# Patient Record
Sex: FEMALE | Race: BLACK OR AFRICAN AMERICAN | NOT HISPANIC OR LATINO
[De-identification: names, ages, dates, MRNs, and addresses within clinical notes are randomized per-mention and may not be internally consistent; named-entity substitution may affect disease eponyms.]

---

## 2017-10-10 PROBLEM — Z00.00 ENCOUNTER FOR PREVENTIVE HEALTH EXAMINATION: Status: ACTIVE | Noted: 2017-10-10

## 2017-10-13 ENCOUNTER — MEDICATION RENEWAL (OUTPATIENT)
Age: 47
End: 2017-10-13

## 2017-11-28 ENCOUNTER — MEDICATION RENEWAL (OUTPATIENT)
Age: 47
End: 2017-11-28

## 2018-01-26 ENCOUNTER — APPOINTMENT (OUTPATIENT)
Dept: ENDOCRINOLOGY | Facility: CLINIC | Age: 48
End: 2018-01-26

## 2018-05-08 ENCOUNTER — APPOINTMENT (OUTPATIENT)
Dept: ENDOCRINOLOGY | Facility: CLINIC | Age: 48
End: 2018-05-08
Payer: COMMERCIAL

## 2018-05-08 VITALS
HEIGHT: 64 IN | TEMPERATURE: 97.3 F | HEART RATE: 82 BPM | DIASTOLIC BLOOD PRESSURE: 77 MMHG | WEIGHT: 214 LBS | OXYGEN SATURATION: 99 % | SYSTOLIC BLOOD PRESSURE: 110 MMHG | BODY MASS INDEX: 36.54 KG/M2

## 2018-05-08 DIAGNOSIS — D64.9 ANEMIA, UNSPECIFIED: ICD-10-CM

## 2018-05-08 DIAGNOSIS — Z83.3 FAMILY HISTORY OF DIABETES MELLITUS: ICD-10-CM

## 2018-05-08 DIAGNOSIS — Z82.49 FAMILY HISTORY OF ISCHEMIC HEART DISEASE AND OTHER DISEASES OF THE CIRCULATORY SYSTEM: ICD-10-CM

## 2018-05-08 DIAGNOSIS — Z82.5 FAMILY HISTORY OF ASTHMA AND OTHER CHRONIC LOWER RESPIRATORY DISEASES: ICD-10-CM

## 2018-05-08 PROCEDURE — 99214 OFFICE O/P EST MOD 30 MIN: CPT

## 2018-05-22 ENCOUNTER — CLINICAL ADVICE (OUTPATIENT)
Age: 48
End: 2018-05-22

## 2018-05-22 LAB
25(OH)D3 SERPL-MCNC: 23.9 NG/ML
ALBUMIN SERPL ELPH-MCNC: 3.9 G/DL
ALP BLD-CCNC: 40 U/L
ALT SERPL-CCNC: 18 U/L
ANION GAP SERPL CALC-SCNC: 13 MMOL/L
AST SERPL-CCNC: 21 U/L
BASOPHILS # BLD AUTO: 0.01 K/UL
BASOPHILS NFR BLD AUTO: 0.3 %
BILIRUB SERPL-MCNC: 0.4 MG/DL
BUN SERPL-MCNC: 12 MG/DL
CALCIUM SERPL-MCNC: 9.4 MG/DL
CHLORIDE SERPL-SCNC: 102 MMOL/L
CHOLEST SERPL-MCNC: 171 MG/DL
CHOLEST/HDLC SERPL: 2.2 RATIO
CO2 SERPL-SCNC: 23 MMOL/L
CREAT SERPL-MCNC: 0.86 MG/DL
EOSINOPHIL # BLD AUTO: 0.03 K/UL
EOSINOPHIL NFR BLD AUTO: 0.8 %
GLUCOSE SERPL-MCNC: 84 MG/DL
HBA1C MFR BLD HPLC: 5.3 %
HCT VFR BLD CALC: 40.8 %
HDLC SERPL-MCNC: 78 MG/DL
HGB BLD-MCNC: 12.2 G/DL
IMM GRANULOCYTES NFR BLD AUTO: 0 %
INSULIN SERPL-MCNC: 5.5 UU/ML
IRON SATN MFR SERPL: 23 %
IRON SERPL-MCNC: 72 UG/DL
LDLC SERPL CALC-MCNC: 85 MG/DL
LYMPHOCYTES # BLD AUTO: 1.8 K/UL
LYMPHOCYTES NFR BLD AUTO: 45.7 %
MAN DIFF?: NORMAL
MCHC RBC-ENTMCNC: 26.9 PG
MCHC RBC-ENTMCNC: 29.9 GM/DL
MCV RBC AUTO: 90.1 FL
MONOCYTES # BLD AUTO: 0.42 K/UL
MONOCYTES NFR BLD AUTO: 10.7 %
NEUTROPHILS # BLD AUTO: 1.68 K/UL
NEUTROPHILS NFR BLD AUTO: 42.5 %
PLATELET # BLD AUTO: 175 K/UL
POTASSIUM SERPL-SCNC: 4.3 MMOL/L
PROLACTIN SERPL-MCNC: 41.9 NG/ML
PROT SERPL-MCNC: 7.1 G/DL
RBC # BLD: 4.53 M/UL
RBC # FLD: 14.4 %
SODIUM SERPL-SCNC: 138 MMOL/L
T4 FREE SERPL-MCNC: 1.3 NG/DL
TESTOST BND SERPL-MCNC: 1 PG/ML
TESTOST SERPL-MCNC: 48.7 NG/DL
TIBC SERPL-MCNC: 316 UG/DL
TRIGL SERPL-MCNC: 41 MG/DL
TSH SERPL-ACNC: 1.36 UIU/ML
UIBC SERPL-MCNC: 244 UG/DL
WBC # FLD AUTO: 3.94 K/UL

## 2018-05-22 RX ORDER — UBIDECARENONE/VIT E ACET 100MG-5
50 MCG CAPSULE ORAL
Refills: 0 | Status: ACTIVE | COMMUNITY
Start: 2018-05-22

## 2018-09-28 ENCOUNTER — APPOINTMENT (OUTPATIENT)
Dept: ENDOCRINOLOGY | Facility: CLINIC | Age: 48
End: 2018-09-28

## 2018-11-02 ENCOUNTER — CLINICAL ADVICE (OUTPATIENT)
Age: 48
End: 2018-11-02

## 2019-01-07 ENCOUNTER — APPOINTMENT (OUTPATIENT)
Dept: ENDOCRINOLOGY | Facility: CLINIC | Age: 49
End: 2019-01-07
Payer: COMMERCIAL

## 2019-01-07 VITALS
HEART RATE: 67 BPM | BODY MASS INDEX: 35.34 KG/M2 | HEIGHT: 64 IN | WEIGHT: 207 LBS | DIASTOLIC BLOOD PRESSURE: 66 MMHG | OXYGEN SATURATION: 97 % | TEMPERATURE: 97.4 F | SYSTOLIC BLOOD PRESSURE: 101 MMHG

## 2019-01-07 PROCEDURE — 99214 OFFICE O/P EST MOD 30 MIN: CPT

## 2019-01-10 NOTE — HISTORY OF PRESENT ILLNESS
[FreeTextEntry1] : She now returns after a hiatus of several months.\dao Feels generally well, and has no new physical complaints.\dao Had lost weight down to 199 lb on her scale, but gained back 5-10 lb during a trip to [place] and from holiday/birthday eating.\dao Exercise is limited to 2 days of kick boxing and martial arts.  Has not been running outdoors.\dao Menses have been slightly irregular--had menses 2 weeks apart in November, but otherwise has continued monthly.  Is probably not having any vasomotor symptoms.\dao Hirsutism is "just as bad"\dao Had the diagnostic mammogram to evaluate a possible abnormality on the left side, and this was negative.\dao Has continued going to the weekly meetings at the [facility] loss program.  Diet lapses over the holidays included baked goods, alcohol and excessive portions of "allowed" foods.\dao Admits to missing occasional dioses of cabergoline, and has been taking the spironolactone only 2-3 days/week.

## 2019-08-13 ENCOUNTER — APPOINTMENT (OUTPATIENT)
Dept: ENDOCRINOLOGY | Facility: CLINIC | Age: 49
End: 2019-08-13
Payer: COMMERCIAL

## 2019-08-13 VITALS
OXYGEN SATURATION: 98 % | WEIGHT: 208 LBS | SYSTOLIC BLOOD PRESSURE: 96 MMHG | DIASTOLIC BLOOD PRESSURE: 63 MMHG | HEIGHT: 64 IN | BODY MASS INDEX: 35.51 KG/M2 | TEMPERATURE: 98.2 F | HEART RATE: 72 BPM

## 2019-08-13 DIAGNOSIS — F32.9 MAJOR DEPRESSIVE DISORDER, SINGLE EPISODE, UNSPECIFIED: ICD-10-CM

## 2019-08-13 PROCEDURE — 99214 OFFICE O/P EST MOD 30 MIN: CPT

## 2019-08-15 PROBLEM — F32.9 DEPRESSION: Status: ACTIVE | Noted: 2018-05-08

## 2019-08-15 NOTE — HISTORY OF PRESENT ILLNESS
[FreeTextEntry1] : Has not had any significant medical issues since her last visit in January, and has no new physical complaints.\par Weight has been stable overall.  (Had gained 7 lb after she took a hiatus from the weight loss program at [facility], but has since lost it back).\par Menses have been generally regular since January with a cycle length of 31 days, though she had one interval of 49 days with amenorrhea.  Saw a new gynecologist in July, had an ultrasound which was apparently negative for PCOS--was told that there "might have been a cyst that ruptured."\par Hirsutism is unchanged, but she admits that she has not been taking the spironolactone regularly.\dao Has also not been taking vitamin D.\par Visual field exam done by her ophthalmologist last month was apparently normal.  \dao Has been having somewhat more frequent headaches, but thinks that these are due to stopping coffee.  \par Has no side-effects from the cabergoline.\dao Has not been exercising regularly--says "I keep starting and stopping."\par Current diet:\par --Breakfast is an egg and a banana. \par --Snacks on pistachio nuts between meals\par --Brings lunch to work--usually leftovers from the night before.  If she goes out, it is a salad or soup\par --Supper is usually protein (chicken or fish) and vegetables.\par --Has reduced her intake of wine to one glass with supper, and does not have it every day\par --Cake, cookies, etc at night are still intermittently a problem. Will sometimes try to limit to one piece of dark chocolate.

## 2019-08-15 NOTE — REVIEW OF SYSTEMS
[Fatigue] : fatigue [Decreased Appetite] : appetite not decreased [Recent Weight Gain (___ Lbs)] : no recent weight gain [Recent Weight Loss (___ Lbs)] : no recent weight loss [Fever] : no fever [Chills] : no chills [Blurry Vision] : no blurred vision [Dry Eyes] : no dryness of the eyes [Eyes Itch] : no itching of the eyes [Dysphagia] : no dysphagia [Hearing Loss] : no hearing loss  [Chest Pain] : no chest pain [Palpitations] : no palpitations [Lower Ext Edema] : no lower extremity edema [Shortness Of Breath] : no shortness of breath [Wheezing] : no wheezing was heard [Cough] : no cough [SOB on Exertion] : no shortness of breath during exertion [Nausea] : no nausea [Constipation] : no constipation [Diarrhea] : no diarrhea [Heartburn] : no heartburn [Polyuria] : no polyuria [Dysuria] : no dysuria [Nocturia] : no nocturia [Muscle Cramps] : no muscle cramps [Myalgia] : no myalgia  [Dry Skin] : no dry skin [Tremors] : no tremors [Pain/Numbness of Digits] : no pain/numbness of digits [Difficulty Walking] : no difficulty walking [Depression] : no depression [Anxiety] : no anxiety [Stress] : no stress [Polydipsia] : no polydipsia [Cold Intolerance] : cold tolerant [Heat Intolerance] : heat tolerant [Easy Bleeding] : no ~M tendency for easy bleeding [Easy Bruising] : no tendency for easy bruising [FreeTextEntry3] : Has early cataracts, also apparently mildly glaucomatous changes on her last exam [FreeTextEntry9] : Mild discomfort (without swelling) in her left knee [de-identified] : Moderate facial hirsutism and mild acne.  Also with hair loss frontally [de-identified] : No insomnia, but sleeps only 6 hours a night and does not get catch-up sleep on weekends

## 2019-08-15 NOTE — PHYSICAL EXAM
[Alert] : alert [Healthy Appearance] : healthy appearance [Normal Sclera/Conjunctiva] : normal sclera/conjunctiva [PERRL] : pupils equal, round and reactive to light [EOMI] : extra ocular movement intact [No Proptosis] : no proptosis [No Lid Lag] : no lid lag [Normal Outer Ear/Nose] : the ears and nose were normal in appearance [Normal Hearing] : hearing was normal [No Neck Mass] : no neck mass was observed [No LAD] : no lymphadenopathy [Thyroid Not Enlarged] : the thyroid was not enlarged [Clear to Auscultation] : lungs were clear to auscultation bilaterally [Normal Rate] : heart rate was normal  [Regular Rhythm] : with a regular rhythm [Carotids Normal] : carotid pulses were normal with no bruits [No Edema] : there was no peripheral edema [Not Tender] : non-tender [No HSM] : no hepato-splenomegaly [Soft] : abdomen soft [Normal] : normal and non tender [No CVA Tenderness] : no ~M costovertebral angle tenderness [Normal Gait] : normal gait [No Joint Swelling] : no joint swelling seen [Normal Strength/Tone] : muscle strength and tone were normal [No Involuntary Movements] : no involuntary movements were seen [No Rash] : no rash [Hirsutism] : hirsutism [No Tremors] : no tremors [Normal Sensation on Monofilament Testing] : normal sensation on monofilament testing of lower extremities [Normal Affect] : the affect was normal [Normal Mood] : the mood was normal [Kyphosis] : no kyphosis present [Foot Ulcers] : no foot ulcers [de-identified] : Moderately obese [de-identified] : No murmurs [de-identified] : Visual fields normal to confrontational testing [de-identified] : DP pulses non-palpable bilaterally but capillary refill normal [de-identified] : No cervical or supraclavicular adenopathy [de-identified] : Mild frontal hairline recession.  NO acanthosis [de-identified] : Vibratory sensation mildly decreased over the toes

## 2019-09-06 ENCOUNTER — APPOINTMENT (OUTPATIENT)
Dept: ENDOCRINOLOGY | Facility: CLINIC | Age: 49
End: 2019-09-06

## 2019-12-03 ENCOUNTER — MEDICATION RENEWAL (OUTPATIENT)
Age: 49
End: 2019-12-03

## 2020-06-12 ENCOUNTER — APPOINTMENT (OUTPATIENT)
Dept: ENDOCRINOLOGY | Facility: CLINIC | Age: 50
End: 2020-06-12
Payer: COMMERCIAL

## 2020-06-12 VITALS
HEART RATE: 70 BPM | WEIGHT: 228 LBS | OXYGEN SATURATION: 100 % | HEIGHT: 64 IN | SYSTOLIC BLOOD PRESSURE: 104 MMHG | BODY MASS INDEX: 38.93 KG/M2 | TEMPERATURE: 97.6 F | DIASTOLIC BLOOD PRESSURE: 70 MMHG

## 2020-06-12 PROCEDURE — 99214 OFFICE O/P EST MOD 30 MIN: CPT

## 2020-06-13 NOTE — PHYSICAL EXAM
[Alert] : alert [Normal Sclera/Conjunctiva] : normal sclera/conjunctiva [No Acute Distress] : no acute distress [EOMI] : extra ocular movement intact [Visual Fields Grossly Intact] : visual fields grossly intact [PERRL] : pupils equal, round and reactive to light [No Proptosis] : no proptosis [No Lid Lag] : no lid lag [Normal Outer Ear/Nose] : the ears and nose were normal in appearance [Normal Hearing] : hearing was normal [No LAD] : no lymphadenopathy [No Neck Mass] : no neck mass was observed [Thyroid Not Enlarged] : the thyroid was not enlarged [No Thyroid Nodules] : no palpable thyroid nodules [Clear to Auscultation] : lungs were clear to auscultation bilaterally [Regular Rhythm] : with a regular rhythm [Normal Rate] : heart rate was normal [Carotids Normal] : carotid pulses were normal with no bruits [No Edema] : no peripheral edema [Not Tender] : non-tender [No HSM] : no hepato-splenomegaly [Soft] : abdomen soft [No CVA Tenderness] : no ~M costovertebral angle tenderness [Normal Anterior Cervical Nodes] : no anterior cervical lymphadenopathy [Normal Supraclavicular Nodes] : no supraclavicular lymphadenopathy [Normal Gait] : normal gait [No Involuntary Movements] : no involuntary movements were seen [No Joint Swelling] : no joint swelling seen [No Tremors] : no tremors [Normal Affect] : the affect was normal [Normal Sensation on Monofilament Testing] : normal sensation on monofilament testing of lower extremities [Normal Mood] : the mood was normal [Kyphosis] : no kyphosis present [Abdominal Striae] : no abdominal striae [Foot Ulcers] : no foot ulcers [de-identified] : Significantly obese [de-identified] : DP pulses 1+ on the left, non-palpable (but with brisk capillary refill) on the right [de-identified] : No murmurs [de-identified] : No noticeable acanthosis in the posterior cervical area.  Moderate facial hirsutism [de-identified] : Vibratory sensation mildly decreased over the toes.  Slight decrease in  strength R hand

## 2020-06-13 NOTE — ASSESSMENT
[FreeTextEntry1] : 1) Hyperprolactinemia/pituitary adenoma:  Prolactin level is normal on her current cabergoline regimen, which she is tolerating without side-effects.  Her last pituitary MRI was in October of 2018 and showed the microadenoma to be stable in size at 5 mm.  The failure of the microadenoma to regress further may be due to the occasional lapses in cabergoline therapy, though it is also possible that the adenoma is in fact non-secretory and not a true "prolactinoma." \par --Continue cabergoline at 0.75 mg 2X/week\par --Will obtain an updated pituitary MRI later this year \par \par 2) PCOS:  Her testosterone level remains in the normal range, but she is "clinically worse" in terms of her oligomenorrhea and worsening hirsutism.  The menstrual irregularity might reflect genia-menopause rather than PCOS, but she has no associated vasomotor symptoms.\par --Reversal of her weight gain is almost certainly the next step.  (See below).\par --Will restart metformin at 500 mg BID\par \par 3) Hirsutism:  Has possibly worsened due to the effects of her weight gain on the PCOS.\par --She will probably try to restart the spironolactone, taking it at night after supper\par \par 4) Obesity:  Has gained back 20 lb since her visit last year.  Although she has not had any intervals with significant dietary lapses, her caloric intake of 1800 calories is higher than her intake during the interval when she was losing weight, and is not likely a sufficient restriction at this point--especially given her somewhat limited physical activity--(both in terms of structured exercise and the fact that she is no longer commuting to and from work every day).  \par --Suggested that she try to decrease her caloric intake back to 1600 calories a day.  The "low hanging fruit" to eliminate from her diet would be the sugar in her coffee, the wine at supper, her tendency to snack on nuts during the day, and the protein shake after supper.\par --Suggested that she look at the food diaries which she kept during her period of maximal weight loss, and compare these to her current eating patterns\par \par 5) Carpal tunnel syndrome L wrist--Symptoms are clearly worse\par --Needs to resume wearing a wrist splint at night.\par --Re-evaluation by hand surgeon if symptoms do not improve with the splint\par \par 25-D level is barely into the target range, and she has lapsed on her supplementation.  To resume 1000 units/day\par \par See for follow-up in 4 months.  CMP, lipids, A1c, Total/bioavail testosterone, PRL, 25-D, insulin level before the visit [FreeTextEntry2] : Diet, metformin and spironolactone therapy

## 2020-06-13 NOTE — DATA REVIEWED
[FreeTextEntry1] : [facility]  (6/2/20)\par \par FBS 91, CMP WNL\par , HDL 76, TG 56\par Total testosterone 44 ng/dl (normal < 45)\par Bioavailable testosterone 2.7 ng/dl  (normal < 5.0)\par TSH level normal at 1.53 uU/ml  (0.4-4.5)\par Prolactin 15 ng/ml (normal < 30)\par 25-D level just into target range at 30 ng/ml\par

## 2020-06-13 NOTE — REVIEW OF SYSTEMS
[Dry Skin] : dry skin [Stress] : stress [Decreased Appetite] : appetite not decreased [Fatigue] : no fatigue [Fever] : no fever [Chills] : no chills [Blurred Vision] : no blurred vision [Visual Field Defect] : no visual field defect [Dry Eyes] : no dryness [Dysphagia] : no dysphagia [Chest Pain] : no chest pain [Hearing Loss] : no hearing loss  [Palpitations] : no palpitations [Shortness Of Breath] : no shortness of breath [Lower Ext Edema] : no lower extremity edema [Cough] : no cough [Wheezing] : no wheezing [Nausea] : no nausea [SOB on Exertion] : no shortness of breath on exertion [Constipation] : no constipation [Heartburn] : no heartburn [Diarrhea] : no diarrhea [Polyuria] : no polyuria [Dysuria] : no dysuria [Nocturia] : no nocturia [Irregular Menses] : regular menses [Muscle Weakness] : no muscle weakness [Myalgia] : no myalgia  [Muscle Cramps] : no muscle cramps [Hair Loss] : no hair loss [Headaches] : no headaches [Difficulty Walking] : no difficulty walking [Depression] : no depression [Tremors] : no tremors [Anxiety] : no anxiety [Polydipsia] : no polydipsia [Easy Bruising] : no tendency for easy bruising [Easy Bleeding] : no ~M tendency for easy bleeding [FreeTextEntry2] : Has gained 20 lb since her last visit [FreeTextEntry9] : Intermittent discomfort in both knees [de-identified] : No neuropathic symptoms in her feet, but paresthesias in her left hand (from her known carpal tunnel syndrome) have been worse.  She has not been wearing her wrist splint.  Does not think that her hand is weak, but admits to dropping things [de-identified] : See comments in the HPI re: hirsutism

## 2020-06-13 NOTE — HISTORY OF PRESENT ILLNESS
[FreeTextEntry1] : 49-year-old woman who was initially diagnosed with hyperprolactinemia and a pituitary microadenoma in 2002, with the adenoma believed to be prolactin-secreting.  She has been on cabergoline therapy since that time, though with occasional interruptions in therapy and recurrence of the hyperprolactinemia during those intervals.  Her PRL levels have been more consistently controlled during the past few years.  The adenoma has decreased from 8 mm to 4-5 mm in size, but has persisted at 5 mm despite normalization of the prolactin level.  Her other active medical problems include obesity and PCOS.  She is on metformin therapy for the PCOS, but has had persistent hirsutism despite normalization of her testosterone level.  \par \par She now returns for follow-up after a hiatus of nearly a year.\dao Has not had any significant medical issues since her last visit, and has no new physical complaints.\dao Had a cough for a few days in March, but did not have any other signs of acute illness, and it was similar to the cough which she usually gets in the spring,.  She was not tested for COVID at that time, and recently had a serology done which was negative.\dao Has not been able to exercise outdoors--tried to run in [place] but it was too crowded.  Has changed to doing exercise videos, plus some stair-climbing on the [facility].  \dao Has maintained contact with the [facility] Center at [facility] via weekly Zoom meetings, but has gained weight nonetheless.\dao Menstrual periods were regular until last fall, and are now completely irregular--occasionally at 3-week intervals, but more often 45-60 days.  Has not had any vasomotor symptoms.  \dao Hirsutism has worsened since the fall.  Saw her dermatologist last week, who told her that the spironolactone would not likely work unless the dose were at least 200 mg/day.  The pt has been unable to tolerate the drug (even at 50 mg/day), however, because of nausea.  \dao Has been consistent in taking the cabergoline.\par Diet was reviewed:\par --Breakfast is an omelet (one egg with vegetables) and coffee (with 2-3 tsp of sugar).\par --Lunch is variable--sometimes leftovers, sometimes cooked vegetables\par --Protein at supper is fish at least twice a week, chicken once a week.  Eats almost no beef.  Usually does not have any starch with the meal.  Was having a glass of wine with supper nearly every day until recently, now having it only a few times a week\par --will sometimes have a protein shake as a snack at night\dao Thinks that she is eating 1800 calories a day, though admits that she was limiting to only 5643-2190/day when she was losing weight in the past.

## 2020-10-09 ENCOUNTER — APPOINTMENT (OUTPATIENT)
Dept: ENDOCRINOLOGY | Facility: CLINIC | Age: 50
End: 2020-10-09
Payer: COMMERCIAL

## 2020-10-09 VITALS
HEART RATE: 78 BPM | SYSTOLIC BLOOD PRESSURE: 97 MMHG | DIASTOLIC BLOOD PRESSURE: 66 MMHG | HEIGHT: 64 IN | OXYGEN SATURATION: 100 % | BODY MASS INDEX: 38.58 KG/M2 | WEIGHT: 226 LBS | TEMPERATURE: 97.6 F

## 2020-10-09 DIAGNOSIS — E04.9 NONTOXIC GOITER, UNSPECIFIED: ICD-10-CM

## 2020-10-09 PROCEDURE — 99215 OFFICE O/P EST HI 40 MIN: CPT

## 2020-10-11 RX ORDER — BUPROPION HYDROCHLORIDE 75 MG/1
75 TABLET, FILM COATED ORAL
Qty: 270 | Refills: 3 | Status: DISCONTINUED | COMMUNITY
Start: 2018-05-08 | End: 2020-10-11

## 2020-10-13 NOTE — HISTORY OF PRESENT ILLNESS
[FreeTextEntry1] : 49-year-old woman who was initially diagnosed with hyperprolactinemia and a pituitary microadenoma in 2002, with the adenoma believed to be prolactin-secreting.  She has been on cabergoline therapy since that time, though with occasional interruptions in therapy and recurrence of the hyperprolactinemia during those intervals.  Her PRL levels have been more consistently controlled during the past few years.  The adenoma has decreased from 8 mm to 4-5 mm in size, but has persisted at 5 mm despite normalization of the prolactin level.  Her other active medical problems include obesity and PCOS.  She is on metformin therapy for the PCOS, but has had persistent hirsutism despite normalization of her testosterone level.  \par \dao Had called two weeks ago complaining that her hirsutism and scalp hair loss had worsened, and wanted to increase the dose of spironolactone.  Although she previously had problems with nausea from the drug, she wanted to try increasing it nonetheless, and has now been tolerating 150 mg in a single dose after supper.  \dao She restarted the metformin after her last visit, but is taking only 500 mg once a day in the morning.  She denies any stomach upset from the drug, and says that she skips the PM dose only because she takes the spirolactone at night and it is "too many pills."\dao Is still working from home full-time.\dao Thought that she had gained weight, but in fact has lost 2 lb by our scale since June.  She blames the difficulty losing weight on a lack of physical activity--has lost the 8000 steps every day which she used to get from commuting to work, and has not been running outdoors because the local park is too crowded.  \dao Is still doing virtual meetings with the [facility] at [facility], and changed her group.  The psychologist heading her new group is actually the person who led the group she was in when she first joined the system.\dao In terms of the diet changes which were discussed at her last visit:\dao --Is still having an average of 1 glass of wine with supper\dao --Is still putting 3 tsp or two tbsp of regular sugar in her coffee (1-2 cups/day) and tea (2 cups/day)\dao --Has curtailed her snacking on nuts at night and also stopped the protein shakes at night\dao Usually eats breakfast at 10 AM, but sometimes does not eat her first meal until noon.  If she eats breakfast, it is eggs with either a slice of bread or a banana.\par --Lunch is usually leftovers from the night before.  If she makes soup, she will sometimes have it with ricky bread.  \par --Protein at supper is beef twice a week, fish twice a week, otherwise chicken.  Has starch at supper twice a week\dao --Is still snacking at night, sometimes on cookies, sometimes chocolate.\dao --Eats more fruit than the above diet history indicates, usually between lunch and supper\dao Admits that she has been doing more eating since working at home--mostly out of boredom and restlessness.\dao Menses are still erratic.  Can be regular for 3 months, then decrease to every 6-12 weeks.  Has not had any vasomotor hot flashes.\par The symptoms from the carpal tunnel syndrome in her left hand have improved, but she once again stopped wearing the splint.\dao Had an ophth exam in March.  Visual head were apparently normal.  She describes a problem with her depth perception, however--i.e. will go to put something on a counter and it will end up falling on the floor--but she did not describe this problem to her ophthalmologist..

## 2020-10-13 NOTE — PHYSICAL EXAM
[Alert] : alert [No Acute Distress] : no acute distress [Normal Sclera/Conjunctiva] : normal sclera/conjunctiva [EOMI] : extra ocular movement intact [PERRL] : pupils equal, round and reactive to light [No Proptosis] : no proptosis [No Lid Lag] : no lid lag [Normal Outer Ear/Nose] : the ears and nose were normal in appearance [Normal Hearing] : hearing was normal [No Neck Mass] : no neck mass was observed [No LAD] : no lymphadenopathy [Clear to Auscultation] : lungs were clear to auscultation bilaterally [No Murmurs] : no murmurs [Normal Rate] : heart rate was normal [Regular Rhythm] : with a regular rhythm [Carotids Normal] : carotid pulses were normal with no bruits [No Edema] : no peripheral edema [Not Tender] : non-tender [Soft] : abdomen soft [No HSM] : no hepato-splenomegaly [Normal Supraclavicular Nodes] : no supraclavicular lymphadenopathy [Normal Anterior Cervical Nodes] : no anterior cervical lymphadenopathy [No CVA Tenderness] : no ~M costovertebral angle tenderness [Normal Gait] : normal gait [No Involuntary Movements] : no involuntary movements were seen [No Joint Swelling] : no joint swelling seen [Normal Strength/Tone] : muscle strength and tone were normal [No Rash] : no rash [No Tremors] : no tremors [Normal Sensation on Monofilament Testing] : normal sensation on monofilament testing of lower extremities [Normal Affect] : the affect was normal [Normal Mood] : the mood was normal [Kyphosis] : no kyphosis present [Foot Ulcers] : no foot ulcers [de-identified] : Markedly obese [de-identified] : Faint corneal arcus.  Visual fields normal to confrontational testing [de-identified] : Thyroid upper limits of normal in size, and somewhat firm in consistency [de-identified] : DP pulses 2+ on the right, non-palpable (but with normal capillary refill) on the left [de-identified] :  strength in the left hand is normal.  Tinels sign over the left wrist is negative. [de-identified] : Moderate acanthosis in the posterior cervical area.  Fronto-temporal scalp hair loss [de-identified] : Vibratory sensation mildly decreased over the toes

## 2020-10-13 NOTE — ADDENDUM
[FreeTextEntry1] : Spoke with the pt on 10/13/20 after final lab report came in:\par \par Total and bioavailable testosterone levels were normal (Bioavailable 3.5 ng/dl, normal < 8.5)\par \par Noted that she has also never had an echocardiogram during her several years on cabergoline therapy.  Have placed order, told her to have this done at [facility].

## 2020-10-13 NOTE — DATA REVIEWED
[FreeTextEntry1] : [facility]  (10/6/20)\par \par FBS 85,  A1c 5.3%\par Insulin level 4.0 uU/ml (normal < 19.6 uU/ml)\par CMP WNL\par , HDL 73, TG 52\par Prolactin 16 ng/ml  (normal 3-30)\par 25-D level in target range at 37 ng/ml\par Testosterone level pending

## 2020-10-13 NOTE — REVIEW OF SYSTEMS
[Irregular Menses] : irregular menses [Dry Skin] : dry skin [Hirsutism] : hirsutism [Hair Loss] : hair loss [Anxiety] : anxiety [Fatigue] : no fatigue [Decreased Appetite] : appetite not decreased [Fever] : no fever [Chills] : no chills [Dry Eyes] : no dryness [Blurred Vision] : no blurred vision [Eyes Itch] : no itch [Dysphagia] : no dysphagia [Hearing Loss] : no hearing loss  [Chest Pain] : no chest pain [Palpitations] : no palpitations [Lower Ext Edema] : no lower extremity edema [Shortness Of Breath] : no shortness of breath [Cough] : no cough [Wheezing] : no wheezing [SOB on Exertion] : no shortness of breath on exertion [Constipation] : no constipation [Heartburn] : no heartburn [Diarrhea] : no diarrhea [Polyuria] : no polyuria [Dysuria] : no dysuria [Muscle Weakness] : no muscle weakness [Myalgia] : no myalgia  [Muscle Cramps] : no muscle cramps [Headaches] : no headaches [Difficulty Walking] : no difficulty walking [Tremors] : no tremors [Depression] : no depression [Insomnia] : no insomnia [Stress] : no stress [Polydipsia] : no polydipsia [Hot Flashes] : no hot flashes [Easy Bleeding] : no ~M tendency for easy bleeding [Easy Bruising] : no tendency for easy bruising [FreeTextEntry2] : Has lost 2 lb since her last visit [FreeTextEntry3] : D [FreeTextEntry7] : Intermittent nausea from her medications [FreeTextEntry9] : Discomfort in her left knee after running [de-identified] : No paresthesias or numbness in her toes, but still has mild symptoms in the fingers of her left hand

## 2020-10-21 RX ORDER — SPIRONOLACTONE 50 MG/1
50 TABLET ORAL DAILY
Qty: 279 | Refills: 2 | Status: DISCONTINUED | COMMUNITY
Start: 2018-05-22 | End: 2020-10-21

## 2021-09-30 ENCOUNTER — APPOINTMENT (OUTPATIENT)
Dept: ENDOCRINOLOGY | Facility: CLINIC | Age: 51
End: 2021-09-30
Payer: COMMERCIAL

## 2021-09-30 VITALS
BODY MASS INDEX: 37.9 KG/M2 | TEMPERATURE: 97.5 F | DIASTOLIC BLOOD PRESSURE: 78 MMHG | WEIGHT: 222 LBS | OXYGEN SATURATION: 100 % | HEIGHT: 64 IN | SYSTOLIC BLOOD PRESSURE: 112 MMHG | HEART RATE: 61 BPM

## 2021-09-30 PROCEDURE — 99215 OFFICE O/P EST HI 40 MIN: CPT

## 2021-09-30 NOTE — HISTORY OF PRESENT ILLNESS
[FreeTextEntry1] : 49-year-old woman who was initially diagnosed with hyperprolactinemia and a pituitary microadenoma in 2002, with the adenoma believed to be prolactin-secreting.  She has been on cabergoline therapy since that time, though with occasional interruptions in therapy and recurrence of the hyperprolactinemia during those intervals.  Her PRL levels have been more consistently controlled during the past few years.  The adenoma has decreased from 8 mm to 4-5 mm in size, but has persisted at 5 mm despite normalization of the prolactin level.  Her other active medical problems include obesity and PCOS.  She is on metformin therapy for the PCOS, but has had persistent hirsutism despite normalization of her testosterone level.  \par \par She now returns after a hiatus of a year..\par Her only new complaint is pain in her left knee.  Started getting pain during a run, and is still having pain even when she walks.  [facility], had venous Dopplers to rule out a DVT because of the swelling in the leg which was negative.  She was told that the ultrasound showed "something in the knee" (?? fluid) but she has not had an MRI or seen an orthopedist.\dao Is still working nearly entirely from home\dao Lost weight (down to 215 lb) but has since regained part of it due to her inability to exercise.\dao Thinks that she is genia-menopausal.  Started getting hot flashes in May, and her menses have decreased to every 3 months.\dao Is still taking metformin only once a day.  Tried to increase both the metformin and spironolactone, but developed severe nausea and went back to her usual doses.  Still has scalp hair loss, and her facial hirsutism is unchanged.  Is also getting a fair amount of facial acne, which tends to improve during the intervals when she is missing periods, but worsens pre-menstrually.  .She is also still having PMS symptoms.  \par Diet reviewed:\par --breakfast is an egg, banana, 1 slice of bread and coffee\par --Is still putting sugar in her tea and coffee.  \par --If she eats lunch, it will be soup, often with another banana\par --If she does not eat lunch, she will tend to snack on nuts (which she says that she counts)\par --Supper is usually just protein and vegetables.  (Protein is usually poultry)\par --Is vague about her post-supper snacking\par Diagnosed with carpal tunnel syndrome late last year, (L > R) and started wearing a wrist splint.  She since stopped the splint, but symptoms (numbness and paresthesias) have been relatively mild\par When she was able to run, it would be for 40 min.outdoors.\par \par

## 2021-09-30 NOTE — REVIEW OF SYSTEMS
[Fatigue] : fatigue [Irregular Menses] : irregular menses [Acne] : acne [Dry Skin] : dry skin [Hirsutism] : hirsutism [Hair Loss] : hair loss [Anxiety] : anxiety [Stress] : stress [Decreased Appetite] : appetite not decreased [Fever] : no fever [Chills] : no chills [Dry Eyes] : no dryness [Blurred Vision] : no blurred vision [Eyes Itch] : no itch [Dysphagia] : no dysphagia [Hearing Loss] : no hearing loss  [Chest Pain] : no chest pain [Palpitations] : no palpitations [Lower Ext Edema] : no lower extremity edema [Shortness Of Breath] : no shortness of breath [Cough] : no cough [Wheezing] : no wheezing [SOB on Exertion] : no shortness of breath on exertion [Constipation] : no constipation [Heartburn] : no heartburn [Diarrhea] : no diarrhea [Polyuria] : no polyuria [Dysuria] : no dysuria [Nocturia] : no nocturia [Muscle Weakness] : no muscle weakness [Myalgia] : no myalgia  [Muscle Cramps] : no muscle cramps [Headaches] : no headaches [Difficulty Walking] : no difficulty walking [Tremors] : no tremors [Depression] : no depression [Insomnia] : no insomnia [Polydipsia] : no polydipsia [Hot Flashes] : no hot flashes [Easy Bleeding] : no ~M tendency for easy bleeding [Easy Bruising] : no tendency for easy bruising [FreeTextEntry2] : Has lost 4 lb since her last visit. [FreeTextEntry7] : Nausea abated after she stopped the spironolactone [FreeTextEntry9] : See HPI re: left knee pain [de-identified] : No paresthesias or numbness in her toes, but still has mild symptoms in the fingers of her left hand [de-identified] : Sleeps 6-7 hours/night but has trouble falling asleep

## 2021-09-30 NOTE — PHYSICAL EXAM
[Alert] : alert [No Acute Distress] : no acute distress [Normal Sclera/Conjunctiva] : normal sclera/conjunctiva [EOMI] : extra ocular movement intact [PERRL] : pupils equal, round and reactive to light [No Proptosis] : no proptosis [No Lid Lag] : no lid lag [Normal Outer Ear/Nose] : the ears and nose were normal in appearance [Normal Hearing] : hearing was normal [No Neck Mass] : no neck mass was observed [No LAD] : no lymphadenopathy [Clear to Auscultation] : lungs were clear to auscultation bilaterally [No Murmurs] : no murmurs [Normal Rate] : heart rate was normal [Regular Rhythm] : with a regular rhythm [Carotids Normal] : carotid pulses were normal with no bruits [No Edema] : no peripheral edema [Not Tender] : non-tender [Soft] : abdomen soft [No HSM] : no hepato-splenomegaly [Normal Supraclavicular Nodes] : no supraclavicular lymphadenopathy [Normal Anterior Cervical Nodes] : no anterior cervical lymphadenopathy [No CVA Tenderness] : no ~M costovertebral angle tenderness [Normal Gait] : normal gait [No Involuntary Movements] : no involuntary movements were seen [Normal Strength/Tone] : muscle strength and tone were normal [No Rash] : no rash [Acne] : acne present [Hirsutism] : hirsutism present [No Tremors] : no tremors [Normal Sensation on Monofilament Testing] : normal sensation on monofilament testing of lower extremities [Normal Affect] : the affect was normal [Normal Mood] : the mood was normal [Kyphosis] : no kyphosis present [Abdominal Striae] : no abdominal striae [Foot Ulcers] : no foot ulcers [de-identified] : Markedly obese [de-identified] : Faint corneal arcus.  Visual fields normal to confrontational testing [de-identified] : Thyroid upper limits of normal in size, still somewhat firm in consistency [de-identified] : DP pulses non-palpable bilaterally [de-identified] : .Mild L knee swelling.  Cannot feel a popliteal cyst [de-identified] : Minimal acanthosis in the posterior cervical area.  Mild fonto-temporal scalp hair loss [de-identified] : Vibratory sensation mildly decreased over the toes

## 2022-05-10 ENCOUNTER — APPOINTMENT (OUTPATIENT)
Dept: ENDOCRINOLOGY | Facility: CLINIC | Age: 52
End: 2022-05-10
Payer: COMMERCIAL

## 2022-05-10 VITALS
DIASTOLIC BLOOD PRESSURE: 70 MMHG | HEART RATE: 95 BPM | SYSTOLIC BLOOD PRESSURE: 103 MMHG | OXYGEN SATURATION: 99 % | HEIGHT: 64 IN | BODY MASS INDEX: 40.8 KG/M2 | TEMPERATURE: 96.8 F | WEIGHT: 239 LBS

## 2022-05-10 DIAGNOSIS — F09 UNSPECIFIED MENTAL DISORDER DUE TO KNOWN PHYSIOLOGICAL CONDITION: ICD-10-CM

## 2022-05-10 PROCEDURE — 99215 OFFICE O/P EST HI 40 MIN: CPT

## 2022-05-10 NOTE — REASON FOR VISIT
[Follow - Up] : a follow-up visit [Pituitary Evaluation/ Disorder] : pituitary evaluation/disorder [PCOS] : PCOS

## 2022-05-15 PROBLEM — F09 COGNITIVE DYSFUNCTION: Status: ACTIVE | Noted: 2022-05-10

## 2022-05-15 NOTE — REVIEW OF SYSTEMS
[Fatigue] : fatigue [Dry Eyes] : dryness [Irregular Menses] : irregular menses [Acne] : acne [Dry Skin] : dry skin [Hirsutism] : hirsutism [Hair Loss] : hair loss [Anxiety] : anxiety [Stress] : stress [Heat Intolerance] : heat intolerance [Decreased Appetite] : appetite not decreased [Fever] : no fever [Chills] : no chills [Blurred Vision] : no blurred vision [Eyes Itch] : no itch [Dysphagia] : no dysphagia [Hearing Loss] : no hearing loss  [Chest Pain] : no chest pain [Palpitations] : no palpitations [Lower Ext Edema] : no lower extremity edema [Shortness Of Breath] : no shortness of breath [Wheezing] : no wheezing [SOB on Exertion] : no shortness of breath on exertion [Nausea] : no nausea [Constipation] : no constipation [Heartburn] : no heartburn [Diarrhea] : no diarrhea [Polyuria] : no polyuria [Dysuria] : no dysuria [Nocturia] : no nocturia [Muscle Weakness] : no muscle weakness [Myalgia] : no myalgia  [Muscle Cramps] : no muscle cramps [Headaches] : no headaches [Difficulty Walking] : no difficulty walking [Tremors] : no tremors [Depression] : no depression [Insomnia] : no insomnia [Polydipsia] : no polydipsia [Cold Intolerance] : no cold intolerance [Hot Flashes] : no hot flashes [Easy Bleeding] : no ~M tendency for easy bleeding [Easy Bruising] : no tendency for easy bruising [FreeTextEntry2] : Has gained 17 lb since her last visit.  Fatigue from lack of sleep [FreeTextEntry3] : Feels that she has problems with depth perception.  Had punctal plugs placed for dry eyes but did not like them [FreeTextEntry4] : Complains of a dry throat [FreeTextEntry6] : Intermittent dry cough [FreeTextEntry7] : Occasional reflux symptoms [FreeTextEntry8] : See HPI re: menses [FreeTextEntry9] : See HPI re: left knee pain [de-identified] : No paresthesias or numbness in her toes, but still has mild symptoms in the fingers of her hands, L > R [de-identified] : See HPI re: hot flashes and ADHD symptoms

## 2022-05-15 NOTE — PHYSICAL EXAM
[Alert] : alert [No Acute Distress] : no acute distress [Normal Sclera/Conjunctiva] : normal sclera/conjunctiva [EOMI] : extra ocular movement intact [PERRL] : pupils equal, round and reactive to light [No Proptosis] : no proptosis [No Lid Lag] : no lid lag [Normal Outer Ear/Nose] : the ears and nose were normal in appearance [Normal Hearing] : hearing was normal [No Neck Mass] : no neck mass was observed [No LAD] : no lymphadenopathy [Clear to Auscultation] : lungs were clear to auscultation bilaterally [No Murmurs] : no murmurs [Normal Rate] : heart rate was normal [Regular Rhythm] : with a regular rhythm [Carotids Normal] : carotid pulses were normal with no bruits [No Edema] : no peripheral edema [Not Tender] : non-tender [Soft] : abdomen soft [No HSM] : no hepato-splenomegaly [Normal Supraclavicular Nodes] : no supraclavicular lymphadenopathy [Normal Anterior Cervical Nodes] : no anterior cervical lymphadenopathy [No CVA Tenderness] : no ~M costovertebral angle tenderness [Normal Gait] : normal gait [No Involuntary Movements] : no involuntary movements were seen [Normal Strength/Tone] : muscle strength and tone were normal [No Rash] : no rash [Hirsutism] : hirsutism present [No Tremors] : no tremors [Normal Sensation on Monofilament Testing] : normal sensation on monofilament testing of lower extremities [Normal Affect] : the affect was normal [Normal Mood] : the mood was normal [Kyphosis] : no kyphosis present [Abdominal Striae] : no abdominal striae [Foot Ulcers] : no foot ulcers [Acne] : no acne [de-identified] : Markedly obese [de-identified] : Faint corneal arcus.  Visual fields normal to confrontational testing [de-identified] : DP pulses non-palpable bilaterally [de-identified] : No definite L knee swelling.  Tinels sign negative bilaterally.   strength grossly normal [de-identified] : Minimal acanthosis in the posterior cervical area.  Mild fonto-temporal scalp hair loss.  Moderate facial hirsutism along the chin [de-identified] : Vibratory sensation mildly decreased over the toes

## 2022-05-15 NOTE — HISTORY OF PRESENT ILLNESS
[FreeTextEntry1] : 49-year-old woman who was initially diagnosed with hyperprolactinemia and a pituitary microadenoma in 2002, with the adenoma believed to be prolactin-secreting.  She has been on cabergoline therapy since that time, though with occasional interruptions in therapy and recurrence of the hyperprolactinemia during those intervals.  Her PRL levels have been more consistently controlled during the past few years.  The adenoma has decreased from 8 mm to 4-5 mm in size, but has persisted at 5 mm despite normalization of the prolactin level.  Her other active medical problems include obesity and PCOS.  She is on metformin therapy for the PCOS, but has had persistent hirsutism despite normalization of her testosterone level.  \par \par She now returns after a hiatus of 8 months.\dao Has not had any acute illnesses or significant medical issues since her last visit.\dao Started PT for her L knee in February.  (She had an MRI, says only that it showed "a little arthritis").  Had steroid injections last May and last September.  Has been unable to run, but joined a gym 2 weeks ago and is able to use an elliptical.  \par In terms of medications, has been unable to tolerate metformin or spironolactone, and has stopped both\par She remains genia-menopausal, and says that the "hot flashes have gone haywire."  Menses are coming every 3-4 months--last one was in January, and was shorter than usual. \dao Has gained 17 lb since her last visit--blames lack of exercise (both lack of "movement" since she is working full-time from home, and lack of sustained exercise because of her knee)\par Current diet:\par --Breakfast is an egg sandwich (with one egg and one slice of bread) plus a banana.  \par --Lunch is usually leftovers from the night before\par --Has two large cups of coffee during the day--usually with 2% milk and 3-4 tsp of sugar\par --Protein at supper is fish 3-4X/week, otherwise chicken.  Most of the meals are just protein and vegetables.  If there is starch at the meal, it will be bread.  Occasionally has cauliflower rice.\par --"Sabotage" is the sugar in the coffee, a few squares of chocolate, cake on special occasions.\dao --Was previously snacking on nuts, but has large stopped this\dao Has thought about restarting the Weight Loss program at [facility] but it is no longer active.\dao Is sleeping poorly--goes to sleep after MN, is able to fall asleep, but awakens at 2-3 AM (usually with a hot flash) and is unable to fall back to sleep.\dao Is having her first (screening) colonoscopy later today\dao Is taking bupropion, but irregularly.\dao Went to a meeting at her son's school last night, and realizes that she has ADHD.  (She had asked me for a referral to have an evaluation for cognitive dysfunction two weeks ago, and I mentioned ADHD to her at that time, but she has not pursued an evaluation for either).  She feels that her main ADHD symptoms are that she has "no concept of time" and is unable to accomplish things unless there is a deadline.  She also feels like "there are 100 things to do, and I can't figure out how to get started."\par Her hirsutism remains significant, but is mostly facial.\par She denies any galactorrhea or headaches\par \par \par \par \par \par She now returns after a hiatus of a year..\par Her only new complaint is pain in her left knee.  Started getting pain during a run, and is still having pain even when she walks.  Was seen at Urgent Care, had venous Dopplers to rule out a DVT because of the swelling in the leg which was negative.  She was told that the ultrasound showed "something in the knee" (?? fluid) but she has not had an MRI or seen an orthopedist.\dao Is still working nearly entirely from home\dao Lost weight (down to 215 lb) but has since regained part of it due to her inability to exercise.\dao Thinks that she is genia-menopausal.  Started getting hot flashes in May, and her menses have decreased to every 3 months.\dao Is still taking metformin only once a day.  Tried to increase both the metformin and spironolactone, but developed severe nausea and went back to her usual doses.  Still has scalp hair loss, and her facial hirsutism is unchanged.  Is also getting a fair amount of facial acne, which tends to improve during the intervals when she is missing periods, but worsens pre-menstrually.  .She is also still having PMS symptoms.  \par Diet reviewed:\par --breakfast is an egg, banana, 1 slice of bread and coffee\par --Is still putting sugar in her tea and coffee.  \par --If she eats lunch, it will be soup, often with another banana\par --If she does not eat lunch, she will tend to snack on nuts (which she says that she counts)\par --Supper is usually just protein and vegetables.  (Protein is usually poultry)\par --Is vague about her post-supper snacking\par Diagnosed with carpal tunnel syndrome late last year, (L > R) and started wearing a wrist splint.  She since stopped the splint, but symptoms (numbness and paresthesias) have been relatively mild\par When she was able to run, it would be for 40 min.outdoors.\par \par

## 2022-05-15 NOTE — DATA REVIEWED
[FreeTextEntry1] : [facility]  (4/8/22)\par \par FBS 98,  A1c 5.3%\par Insulin level 10.3 mIU/ml\par CMP WNL  (K 4.5 meq/l)\par , HDL 80, \par TSH 1.86 uU/ml  (0.4-4.5)\par FSH 44.2 mIU/ml  (post-menopausal > 23)\par Total testosterone 22 ng/dl  (< 45)\par Bioavail testosterone 3.8 ng/dl  (0.5-8.5)\par Prolactin 7.6 ng/m l  (2-20)\par 25-D 28 ng/ml

## 2022-09-23 ENCOUNTER — APPOINTMENT (OUTPATIENT)
Dept: ENDOCRINOLOGY | Facility: CLINIC | Age: 52
End: 2022-09-23

## 2023-03-10 ENCOUNTER — APPOINTMENT (OUTPATIENT)
Dept: ENDOCRINOLOGY | Facility: CLINIC | Age: 53
End: 2023-03-10
Payer: COMMERCIAL

## 2023-03-10 VITALS
OXYGEN SATURATION: 100 % | HEIGHT: 64 IN | HEART RATE: 80 BPM | TEMPERATURE: 97.8 F | DIASTOLIC BLOOD PRESSURE: 74 MMHG | WEIGHT: 248 LBS | SYSTOLIC BLOOD PRESSURE: 107 MMHG | BODY MASS INDEX: 42.34 KG/M2

## 2023-03-10 PROCEDURE — 99215 OFFICE O/P EST HI 40 MIN: CPT

## 2023-03-10 RX ORDER — BUPROPION HYDROCHLORIDE 200 MG/1
200 TABLET, FILM COATED, EXTENDED RELEASE ORAL DAILY
Qty: 90 | Refills: 3 | Status: DISCONTINUED | COMMUNITY
Start: 2019-08-13 | End: 2023-03-10

## 2023-03-10 RX ORDER — SPIRONOLACTONE 100 MG/1
100 TABLET ORAL DAILY
Qty: 180 | Refills: 1 | Status: DISCONTINUED | COMMUNITY
Start: 2020-10-21 | End: 2023-03-10

## 2023-03-10 RX ORDER — METFORMIN HYDROCHLORIDE 500 MG/1
500 TABLET, COATED ORAL TWICE DAILY
Qty: 180 | Refills: 3 | Status: DISCONTINUED | COMMUNITY
Start: 2020-06-13 | End: 2023-03-10

## 2023-03-13 NOTE — REASON FOR VISIT
[Follow - Up] : a follow-up visit [Pituitary Evaluation/ Disorder] : pituitary evaluation/disorder [Weight Management/Obesity] : weight management/obesity [PCOS] : PCOS

## 2023-04-30 NOTE — HISTORY OF PRESENT ILLNESS
[FreeTextEntry1] : 52-year-old woman who was initially diagnosed with hyperprolactinemia and a pituitary microadenoma in 2002, with the adenoma believed to be prolactin-secreting.  She has been on cabergoline therapy since that time, though with occasional interruptions in therapy and recurrence of the hyperprolactinemia during those intervals.  Her PRL levels have been more consistently controlled during the past few years.  The adenoma had decreased from 8 mm to 4-5 mm in size on the MRI done in 2018, and could not be identified on the most recent MRI in 2022.  \par Her other active medical problems include obesity and PCOS.  She is on metformin therapy for the PCOS, but has had persistent hirsutism despite normalization of her testosterone level.  \par \par She again returns after a rather long hiatus--almost a year.\dao Interim history is significant for a meniscus repair in her left knee in January.  She is still doing PT, but complains that her knee continues to feel stiff. She was told by the orthopedist that she has a moderate amount of arthritis in the knee.\dao Thinks that her facial hirsutism is somewhat worse, and has also noted further loss of scalp hair.  She saw a dermatologist about this earlier in the week, but was not offered any other options.  (She has been unable to tolerate spironolactone due to nausea).\par Medications reviewed.  She has stopped both the bupropion and the metformin.\par Her son is now in the 11th grade, and has been doing better.\dao Went back to a weight loss program which was run privately by the people who used to be at [facility].  Despite her success with the program in the past, she has been unable to lose any weight during the past few months.  She is not sure why the program did not work this time.\dao Is working mostly from home, but has been under significant pressure in terms of the amount of work.  Will also need to be traveling. \par Current diet was reviewed:\par --Breakfast is one egg and a banana, plus two cups of coffee.  She has stopped putting regular sugar in the coffee.\par --Lunch is variable--depends on whether she is at home or at the office.  Will have homemade soup quite often\par --Protein at supper is fish 1-2 days/week, otherwise chicken.  If there is starch at the meal, it will be bread.  Tries to have only vegetables with the meal. \dao --"Sabotage" is occasional cookies or chocolate\dao Has been unable to exercise due to the surgery on her knee.

## 2023-04-30 NOTE — PHYSICAL EXAM
[Alert] : alert [No Acute Distress] : no acute distress [Normal Sclera/Conjunctiva] : normal sclera/conjunctiva [EOMI] : extra ocular movement intact [PERRL] : pupils equal, round and reactive to light [No Proptosis] : no proptosis [No Lid Lag] : no lid lag [Normal Outer Ear/Nose] : the ears and nose were normal in appearance [Normal Hearing] : hearing was normal [No Neck Mass] : no neck mass was observed [No LAD] : no lymphadenopathy [Clear to Auscultation] : lungs were clear to auscultation bilaterally [No Murmurs] : no murmurs [Normal Rate] : heart rate was normal [Regular Rhythm] : with a regular rhythm [Carotids Normal] : carotid pulses were normal with no bruits [No Edema] : no peripheral edema [Not Tender] : non-tender [Soft] : abdomen soft [Normal Supraclavicular Nodes] : no supraclavicular lymphadenopathy [Normal Anterior Cervical Nodes] : no anterior cervical lymphadenopathy [No CVA Tenderness] : no ~M costovertebral angle tenderness [Normal Gait] : normal gait [No Involuntary Movements] : no involuntary movements were seen [Normal Strength/Tone] : muscle strength and tone were normal [No Rash] : no rash [Hirsutism] : hirsutism present [No Tremors] : no tremors [Normal Sensation on Monofilament Testing] : normal sensation on monofilament testing of lower extremities [Normal Affect] : the affect was normal [Normal Mood] : the mood was normal [Thyroid Not Enlarged] : the thyroid was not enlarged [No Thyroid Nodules] : no palpable thyroid nodules [Kyphosis] : no kyphosis present [Abdominal Striae] : no abdominal striae [Foot Ulcers] : no foot ulcers [Acne] : no acne [de-identified] : Markedly obese [de-identified] : Faint corneal arcus.  Visual fields normal to confrontational testing [de-identified] : DP pulses non-palpable bilaterally, but capillary refill is normal [de-identified] : Difficult to feel a distinct liver edge given her obesity [de-identified] : Moderate residual swelling in her left knee.  Tinels sign negative bilaterally.   strength grossly normal [de-identified] : Minimal acanthosis in the posterior cervical area.  Moderate fronto-temporal scalp hair loss.  Moderate facial hirsutism along the chin [de-identified] : Vibratory sensation mildly decreased over the toes

## 2023-04-30 NOTE — REVIEW OF SYSTEMS
[Dry Eyes] : dryness [Acne] : acne [Dry Skin] : dry skin [Hirsutism] : hirsutism [Hair Loss] : hair loss [Stress] : stress [Heat Intolerance] : heat intolerance [Eyes Itch] : itch [Fatigue] : fatigue [Decreased Appetite] : appetite not decreased [Fever] : no fever [Chills] : no chills [Blurred Vision] : no blurred vision [Dysphagia] : no dysphagia [Hearing Loss] : no hearing loss  [Chest Pain] : no chest pain [Palpitations] : no palpitations [Lower Ext Edema] : no lower extremity edema [Shortness Of Breath] : no shortness of breath [Wheezing] : no wheezing [SOB on Exertion] : no shortness of breath on exertion [Nausea] : no nausea [Heartburn] : no heartburn [Diarrhea] : no diarrhea [Polyuria] : no polyuria [Dysuria] : no dysuria [Myalgia] : no myalgia  [Muscle Weakness] : no muscle weakness [Muscle Cramps] : no muscle cramps [Headaches] : no headaches [Difficulty Walking] : no difficulty walking [Tremors] : no tremors [Depression] : no depression [Insomnia] : no insomnia [Anxiety] : no anxiety [Polydipsia] : no polydipsia [Cold Intolerance] : no cold intolerance [Hot Flashes] : no hot flashes [Easy Bleeding] : no ~M tendency for easy bleeding [Easy Bruising] : no tendency for easy bruising [FreeTextEntry2] : Has gained another 9 lb since her last visit 10 months ago [FreeTextEntry3] : Punctal plugs have likely fallen out.  Has a foreign body sensation in her R eye [FreeTextEntry4] : Complains of a dry throat [FreeTextEntry6] : Intermittent dry cough [FreeTextEntry7] : Occasional mild constipation [FreeTextEntry9] : See HPI re: left knee surgery [FreeTextEntry8] : Has been amenorrheic since October of last year.  Nocturia once a night [de-identified] : No paresthesias or numbness in her toes, but still has moderate symptoms in the fingers of her hands, L > R [de-identified] : Significant hot flashes

## 2023-04-30 NOTE — DATA REVIEWED
[FreeTextEntry1] : [facility]  (3/9/23)\par \par FBS 88,  A1c 5.4%, Insulin level normal at 8.9 mIU/ml\par CMP WNL\par , HDL 72, TG 49\par Prolactin 12.1 ng/ml  (2-20)\par TSH level normal at 2.04 uU/ml  (0.4-4.5)\par CBC WNL except for borderline low total WBC (3700)\par FSH 13.5 U/ml (Post-menopause > 23)\par 25-D level below target range at 25 ng/ml\par Total testosterone 30 ng/dl (2-45)\par Bioavail testosterone 3.6 ng/dl  (0.5-8.5)\par

## 2023-04-30 NOTE — ASSESSMENT
[FreeTextEntry2] : Diet, Noom program, GLP-1 therapy [FreeTextEntry1] : 1) Hyperprolactinemia/micoradenoma:  Prolactin level is now WNL on cabergoline 0.75 mg twice a week.  She has now been amenorrheic for several months, but this is clearly not due to the hyperprolactinemia.\par --Given the normal PRL level and the absence of a discrete adenoma on the MRI done last year, to decrease the cabergoline to 0.5 mg twice a week\par \par 2) PCOS:  The only manifestation of the PCOS at this point is her hirsutism, which has persisted despite normal testosterone levels.  Her amenorrhea is almost certainly due to incipient menopause.  Her insulin level is WNL even though she has stopped the metformin.\par --In the absence of hyperinsulinemia, will not push her to restart the metformin.  It clearly did not help with weight loss \par \par 3) Hyperlipidemia:  LDL-cholesterol level is distinctly above the optimal of 100 mg%, but the current level does not warrant statin therapy.  Her diet does not seem to leave room for significant modification at this point.\par --Diet was reinforced\par --Follow lipid profiles, mayo since her LDL may start to rise post-menopausally\par \par 4) Obesity:  Has gained another 9 lb in the past year.  Her diet recall suggests that her basic meals are OK,  Her "sabotage" appears to be with the cake and cookies at night, though the frequency and amount are uncertain.  It is unclear why the meal plan which was successful in the past is not working, but there may be more stress-related eating than in the past.\par --Suggested that she look into the Noom program to help her keep track of calories\par --She is a candidate for bariatric surgery but does not want it.  Would therefore use a GLP-1 agent as an alternative.  Asked her to investigate coverage for Wegovy.  (Told her that the combination of the Wegovy plus the Noom program would be the best option)\par \par 5) Vitamin D deficiency:  25-D level is below target range, but she has not been taking her supplementation consistently.\par --To take 2000 units D consistently\par \par 6) Hirsutism:  There are few options at this point, mayo since she does not tolerate spironolactone.  Her testosterone level is well into the normal range\par \par 7) Carpal tunnel syndrome:  Symptoms are slowly worsening, and she needs to consider surgery at this point before the nerve damage progresses further\par --Suggested that she see Dr. Tesfaye for evaluation\par \par See for follow-up in 3-4 months.\par She is to check back with me re: coverage for the Wegovy\par CMP, lipids, A1c, TFTs, PRL, FSH, 25-D before the visit

## 2023-06-09 ENCOUNTER — APPOINTMENT (OUTPATIENT)
Dept: ENDOCRINOLOGY | Facility: CLINIC | Age: 53
End: 2023-06-09
Payer: COMMERCIAL

## 2023-06-09 VITALS
BODY MASS INDEX: 43.26 KG/M2 | OXYGEN SATURATION: 99 % | TEMPERATURE: 97.6 F | WEIGHT: 253.4 LBS | DIASTOLIC BLOOD PRESSURE: 77 MMHG | SYSTOLIC BLOOD PRESSURE: 112 MMHG | HEIGHT: 64 IN | HEART RATE: 89 BPM

## 2023-06-09 PROCEDURE — 99214 OFFICE O/P EST MOD 30 MIN: CPT

## 2023-06-11 NOTE — PHYSICAL EXAM
[Alert] : alert [No Acute Distress] : no acute distress [Normal Sclera/Conjunctiva] : normal sclera/conjunctiva [EOMI] : extra ocular movement intact [PERRL] : pupils equal, round and reactive to light [No Proptosis] : no proptosis [No Lid Lag] : no lid lag [Normal Outer Ear/Nose] : the ears and nose were normal in appearance [Normal Hearing] : hearing was normal [No Neck Mass] : no neck mass was observed [No LAD] : no lymphadenopathy [Thyroid Not Enlarged] : the thyroid was not enlarged [No Thyroid Nodules] : no palpable thyroid nodules [Clear to Auscultation] : lungs were clear to auscultation bilaterally [No Murmurs] : no murmurs [Normal Rate] : heart rate was normal [Regular Rhythm] : with a regular rhythm [Carotids Normal] : carotid pulses were normal with no bruits [No Edema] : no peripheral edema [Not Tender] : non-tender [Soft] : abdomen soft [Normal Supraclavicular Nodes] : no supraclavicular lymphadenopathy [Normal Anterior Cervical Nodes] : no anterior cervical lymphadenopathy [No CVA Tenderness] : no ~M costovertebral angle tenderness [Normal Gait] : normal gait [No Involuntary Movements] : no involuntary movements were seen [Normal Strength/Tone] : muscle strength and tone were normal [No Rash] : no rash [Hirsutism] : hirsutism present [No Tremors] : no tremors [Normal Sensation on Monofilament Testing] : normal sensation on monofilament testing of lower extremities [Normal Affect] : the affect was normal [Normal Mood] : the mood was normal [Kyphosis] : no kyphosis present [Abdominal Striae] : no abdominal striae [Foot Ulcers] : no foot ulcers [de-identified] : Markedly obese [Acne] : no acne [de-identified] : Faint corneal arcus.  Visual fields normal to confrontational testing [de-identified] : DP pulses non-palpable bilaterally, but capillary refill is normal [de-identified] : Difficult to feel a distinct liver edge given her obesity [de-identified] : Moderate residual swelling in her left knee.  Tinels sign negative bilaterally.   strength grossly normal [de-identified] : Minimal acanthosis in the posterior cervical area.  Moderate fronto-temporal scalp hair loss.  Moderate facial hirsutism along the chin [de-identified] : Vibratory sensation mildly decreased over the toes

## 2023-06-11 NOTE — HISTORY OF PRESENT ILLNESS
[FreeTextEntry1] : 52-year-old woman who was initially diagnosed with hyperprolactinemia and a pituitary microadenoma in 2002, with the adenoma believed to be prolactin-secreting.  She has been on cabergoline therapy since that time, though with occasional interruptions in therapy and recurrence of the hyperprolactinemia during those intervals.  Her PRL levels have been more consistently controlled during the past few years.  The adenoma had decreased from 8 mm to 4-5 mm in size on the MRI done in 2018, and could not be identified on the most recent MRI in 2022.  \par Her other active medical problems include obesity and PCOS.  She is on metformin therapy for the PCOS, but has had persistent hirsutism despite normalization of her testosterone level.  \par \par Started on Wegovy 0.25 mg/week, but only 3 weeks ago--because she was traveling for work\par Gained weight after her last visit, partially because of having to eat in restaurants while working in [place] and [place] for the three weeks.  She gained approximately 10 lb, but has lost 6 lb since starting the Wegovy.  \par She does not have any nausea or change in bowel pattern on the Wegovy,  She also is not sure whether she has any distinct appetite suppression, but describes bloating after some of her meals--blames this on "sugar"\par Current diet:\par --Usual breakfast is an egg, banana and coffee\par --Lunch is often a salad with some type of added protein--often salmon\par --Cannot describe a "usual" supper.  Has fish at least 3 days/week\par --Still has chocolate every day, and probably still has "sugar cravings"\par The carpal tunnel symptoms in her R hand have worsened, and she is happy that she had the wrist splint with her when she was on the road.  The main symptoms are painful paresthesias in the fingers, plus some weakness.  Is having problems with dropping things.\par She has not done any new bloodwork.\par \par \par \par \par \par \par \par She again returns after a rather long hiatus--almost a year.\par Interim history is significant for a meniscus repair in her left knee in January.  She is still doing PT, but complains that her knee continues to feel stiff. She was told by the orthopedist that she has a moderate amount of arthritis in the knee.\dao Thinks that her facial hirsutism is somewhat worse, and has also noted further loss of scalp hair.  She saw a dermatologist about this earlier in the week, but was not offered any other options.  (She has been unable to tolerate spironolactone due to nausea).\par Medications reviewed.  She has stopped both the bupropion and the metformin.\par Her son is now in the 11th grade, and has been doing better.\dao Went back to a weight loss program which was run privately by the people who used to be at [facility].  Despite her success with the program in the past, she has been unable to lose any weight during the past few months.  She is not sure why the program did not work this time.\dao Is working mostly from home, but has been under significant pressure in terms of the amount of work.  Will also need to be traveling. \par Current diet was reviewed:\par --Breakfast is one egg and a banana, plus two cups of coffee.  She has stopped putting regular sugar in the coffee.\par --Lunch is variable--depends on whether she is at home or at the office.  Will have homemade soup quite often\par --Protein at supper is fish 1-2 days/week, otherwise chicken.  If there is starch at the meal, it will be bread.  Tries to have only vegetables with the meal. \par --"Sabotage" is occasional cookies or chocolate\dao Has been unable to exercise due to the surgery on her knee.

## 2023-06-11 NOTE — REVIEW OF SYSTEMS
[Fatigue] : fatigue [Dry Eyes] : dryness [Eyes Itch] : itch [Acne] : acne [Dry Skin] : dry skin [Hirsutism] : hirsutism [Hair Loss] : hair loss [Stress] : stress [Heat Intolerance] : heat intolerance [Decreased Appetite] : appetite not decreased [Chills] : no chills [Fever] : no fever [Blurred Vision] : no blurred vision [Dysphagia] : no dysphagia [Hearing Loss] : no hearing loss  [Chest Pain] : no chest pain [Palpitations] : no palpitations [Lower Ext Edema] : no lower extremity edema [Shortness Of Breath] : no shortness of breath [SOB on Exertion] : no shortness of breath on exertion [Wheezing] : no wheezing [Nausea] : no nausea [Heartburn] : no heartburn [Diarrhea] : no diarrhea [Polyuria] : no polyuria [Dysuria] : no dysuria [Muscle Weakness] : no muscle weakness [Myalgia] : no myalgia  [Muscle Cramps] : no muscle cramps [Headaches] : no headaches [Difficulty Walking] : no difficulty walking [Tremors] : no tremors [Depression] : no depression [Insomnia] : no insomnia [Anxiety] : no anxiety [Polydipsia] : no polydipsia [Cold Intolerance] : no cold intolerance [Hot Flashes] : no hot flashes [Easy Bleeding] : no ~M tendency for easy bleeding [Easy Bruising] : no tendency for easy bruising [FreeTextEntry2] : Has gained another 9 lb since her last visit 10 months ago [FreeTextEntry3] : Punctal plugs have likely fallen out.  Has a foreign body sensation in her R eye [FreeTextEntry4] : Complains of a dry throat [FreeTextEntry6] : Intermittent dry cough [FreeTextEntry7] : Occasional mild constipation [FreeTextEntry9] : See HPI re: left knee surgery [FreeTextEntry8] : Has been amenorrheic since October of last year.  Nocturia once a night [de-identified] : No paresthesias or numbness in her toes, but still has moderate symptoms in the fingers of her hands, L > R [de-identified] : Significant hot flashes

## 2023-06-19 ENCOUNTER — APPOINTMENT (OUTPATIENT)
Dept: ORTHOPEDIC SURGERY | Facility: CLINIC | Age: 53
End: 2023-06-19
Payer: COMMERCIAL

## 2023-06-19 VITALS — HEIGHT: 64 IN | BODY MASS INDEX: 42.85 KG/M2 | RESPIRATION RATE: 16 BRPM | WEIGHT: 251 LBS

## 2023-06-19 PROCEDURE — 99205 OFFICE O/P NEW HI 60 MIN: CPT

## 2023-06-19 PROCEDURE — 73110 X-RAY EXAM OF WRIST: CPT | Mod: 50

## 2023-06-30 ENCOUNTER — NON-APPOINTMENT (OUTPATIENT)
Age: 53
End: 2023-06-30

## 2023-07-07 ENCOUNTER — NON-APPOINTMENT (OUTPATIENT)
Age: 53
End: 2023-07-07

## 2023-08-10 ENCOUNTER — NON-APPOINTMENT (OUTPATIENT)
Age: 53
End: 2023-08-10

## 2023-08-13 ENCOUNTER — NON-APPOINTMENT (OUTPATIENT)
Age: 53
End: 2023-08-13

## 2023-08-14 ENCOUNTER — APPOINTMENT (OUTPATIENT)
Dept: ENDOCRINOLOGY | Facility: CLINIC | Age: 53
End: 2023-08-14
Payer: COMMERCIAL

## 2023-08-14 ENCOUNTER — TRANSCRIPTION ENCOUNTER (OUTPATIENT)
Age: 53
End: 2023-08-14

## 2023-08-14 ENCOUNTER — NON-APPOINTMENT (OUTPATIENT)
Age: 53
End: 2023-08-14

## 2023-08-14 VITALS
SYSTOLIC BLOOD PRESSURE: 113 MMHG | HEART RATE: 88 BPM | BODY MASS INDEX: 42 KG/M2 | WEIGHT: 246 LBS | HEIGHT: 64 IN | OXYGEN SATURATION: 99 % | TEMPERATURE: 96.5 F | DIASTOLIC BLOOD PRESSURE: 81 MMHG

## 2023-08-14 DIAGNOSIS — Z01.818 ENCOUNTER FOR OTHER PREPROCEDURAL EXAMINATION: ICD-10-CM

## 2023-08-14 PROCEDURE — 93000 ELECTROCARDIOGRAM COMPLETE: CPT

## 2023-08-14 PROCEDURE — 99213 OFFICE O/P EST LOW 20 MIN: CPT | Mod: 25

## 2023-08-14 RX ORDER — SEMAGLUTIDE 0.25 MG/.5ML
0.25 INJECTION, SOLUTION SUBCUTANEOUS
Qty: 3 | Refills: 2 | Status: DISCONTINUED | COMMUNITY
Start: 2023-05-01 | End: 2023-08-14

## 2023-08-14 RX ORDER — PAROXETINE HYDROCHLORIDE 40 MG/1
TABLET, FILM COATED ORAL
Refills: 0 | Status: DISCONTINUED | COMMUNITY
End: 2023-08-14

## 2023-08-14 RX ORDER — CHLORHEXIDINE GLUCONATE 213 G/1000ML
1 SOLUTION TOPICAL DAILY
Refills: 0 | Status: DISCONTINUED | OUTPATIENT
Start: 2023-08-15 | End: 2023-08-15

## 2023-08-14 NOTE — HISTORY OF PRESENT ILLNESS
[FreeTextEntry1] : 52-year-old woman who was initially diagnosed with hyperprolactinemia and a pituitary microadenoma in 2002, with the adenoma believed to be prolactin-secreting.  She has been on cabergoline therapy since that time, though with occasional interruptions in therapy and recurrence of the hyperprolactinemia during those intervals.  Her PRL levels have been more consistently controlled during the past few years.  The adenoma had decreased from 8 mm to 4-5 mm in size on the MRI done in 2018, and could not be identified on the most recent MRI in 2022.   Her other active medical problems include obesity and PCOS.  She is on metformin therapy for the PCOS, but has had persistent hirsutism despite normalization of her testosterone level.    Pt returns today for follow-up of her obesity and for pre-op clearance in anticipation of the carpal tunnel surgery on her R hand tomorrow. Is now up to 0.5 mg of semaglutide/week.  (Has been given Ozempic by the pharmacy because the Wegovy is on back order).  Has not had any side-effects from the medication--the headaches which were bothering her initially have resolved,. Does not have any nausea.  Feels that that the drug has curtailed her snacking and her "craving for sweets." Has not had any acute illnesses since her last visit, but has had a significant increase in pain in her R hand, which is now radiating up her arm. Has not taken and aspirin in the past week, but took one Advil on 8/11.

## 2023-08-14 NOTE — DATA REVIEWED
[FreeTextEntry1] : [facility]  (8/4/23)  CBC--Hb 12.4 gm, WBC 4700, Platelets 215, 000 CMP--Glucose 90 mg%, CMP WNL

## 2023-08-14 NOTE — ASSESSMENT
[FreeTextEntry1] : 1) Encounter for pre-op exam: CBC and CMP are WNL Exam and ROS without significant abnormalities EKG -- SR, WNL She has taken one Advil 3 days ago, which should not be a problem, but will have her check with Dr. Tesfaye's office. She is medically cleared for tomorrow's surgery  2) Obesity:  Has lost 7 lb since her last visit.  Has moderate appetite suppression from the 0.5 mg dose of Wegovy. --To increase to 1 mg/week  Return for follow-up in October.  CMP, lipids, TFT and PRL before the visit

## 2023-08-14 NOTE — REVIEW OF SYSTEMS
[Decreased Appetite] : decreased appetite [Acne] : acne [Dry Skin] : dry skin [Hirsutism] : hirsutism [Hair Loss] : hair loss [Stress] : stress [Heat Intolerance] : heat intolerance [Fatigue] : no fatigue [Fever] : no fever [Chills] : no chills [Dry Eyes] : no dryness [Blurred Vision] : no blurred vision [Eyes Itch] : no itch [Dysphagia] : no dysphagia [Hearing Loss] : no hearing loss  [Chest Pain] : no chest pain [Palpitations] : no palpitations [Lower Ext Edema] : no lower extremity edema [Shortness Of Breath] : no shortness of breath [Cough] : no cough [Wheezing] : no wheezing [Nausea] : no nausea [Constipation] : no constipation [Heartburn] : no heartburn [Diarrhea] : no diarrhea [Polyuria] : no polyuria [Dysuria] : no dysuria [Muscle Weakness] : no muscle weakness [Myalgia] : no myalgia  [Muscle Cramps] : no muscle cramps [Headaches] : no headaches [Difficulty Walking] : no difficulty walking [Tremors] : no tremors [Depression] : no depression [Insomnia] : no insomnia [Anxiety] : no anxiety [Polydipsia] : no polydipsia [Cold Intolerance] : no cold intolerance [Hot Flashes] : no hot flashes [Easy Bleeding] : no ~M tendency for easy bleeding [Easy Bruising] : no tendency for easy bruising [FreeTextEntry2] : Has lost 7 lb since her last visit [FreeTextEntry3] : Excessive tearing in both eyes [FreeTextEntry6] : RESENDIZ only with stairs [FreeTextEntry8] : Has been amenorrheic since March.  Nocturia once a night [FreeTextEntry9] : Still with pain in her left knee, will be having Synvisc injections [de-identified] : No paresthesias or numbness in her toes, but has significant pain and paresthesias in both hands [de-identified] : Significant hot flashes

## 2023-08-14 NOTE — ASU PATIENT PROFILE, ADULT - FALL HARM RISK - PT AGE POPULATION HIDDEN
Adult Z Plasty Text: The lesion was extirpated to the level of the fat with a #15 scalpel blade.  Given the location of the defect, shape of the defect and the proximity to free margins a Z-plasty was deemed most appropriate for repair.  Using a sterile surgical marker, the appropriate transposition arms of the Z-plasty were drawn incorporating the defect and placing the expected incisions within the relaxed skin tension lines where possible.    The area thus outlined was incised deep to adipose tissue with a #15 scalpel blade.  The skin margins were undermined to an appropriate distance in all directions utilizing iris scissors.  The opposing transposition arms were then transposed into place in opposite direction and anchored with interrupted buried subcutaneous sutures.

## 2023-08-14 NOTE — ASU PATIENT PROFILE, ADULT - FALL HARM RISK - HARM RISK INTERVENTIONS

## 2023-08-14 NOTE — PHYSICAL EXAM
[Alert] : alert [No Acute Distress] : no acute distress [Normal Sclera/Conjunctiva] : normal sclera/conjunctiva [EOMI] : extra ocular movement intact [PERRL] : pupils equal, round and reactive to light [No Proptosis] : no proptosis [No Lid Lag] : no lid lag [Normal Outer Ear/Nose] : the ears and nose were normal in appearance [Normal Hearing] : hearing was normal [No Neck Mass] : no neck mass was observed [No LAD] : no lymphadenopathy [Thyroid Not Enlarged] : the thyroid was not enlarged [No Thyroid Nodules] : no palpable thyroid nodules [Clear to Auscultation] : lungs were clear to auscultation bilaterally [No Murmurs] : no murmurs [Normal Rate] : heart rate was normal [Regular Rhythm] : with a regular rhythm [Carotids Normal] : carotid pulses were normal with no bruits [No Edema] : no peripheral edema [Not Tender] : non-tender [Soft] : abdomen soft [Normal Supraclavicular Nodes] : no supraclavicular lymphadenopathy [Normal Anterior Cervical Nodes] : no anterior cervical lymphadenopathy [No CVA Tenderness] : no ~M costovertebral angle tenderness [Normal Gait] : normal gait [No Involuntary Movements] : no involuntary movements were seen [Normal Strength/Tone] : muscle strength and tone were normal [No Rash] : no rash [Hirsutism] : hirsutism present [No Tremors] : no tremors [Normal Sensation on Monofilament Testing] : normal sensation on monofilament testing of lower extremities [Normal Affect] : the affect was normal [Normal Mood] : the mood was normal [Kyphosis] : no kyphosis present [Abdominal Striae] : no abdominal striae [Foot Ulcers] : no foot ulcers [Acne] : no acne [de-identified] : Markedly obese [de-identified] : Faint corneal arcus.  Visual fields normal to confrontational testing [de-identified] : No dentures or loose teeth [de-identified] : DP pulses non-palpable bilaterally, but capillary refill is normal [de-identified] : Difficult to feel a distinct liver edge given her obesity [de-identified] : Moderate residual swelling in her left knee.  Tinels sign negative bilaterally.   strength grossly normal [de-identified] : Moderate acanthosis in the posterior cervical area.  Moderate fronto-temporal scalp hair loss.  Moderate facial hirsutism along the chin [de-identified] : Vibratory sensation mildly decreased over the toes

## 2023-08-15 ENCOUNTER — APPOINTMENT (OUTPATIENT)
Dept: ORTHOPEDIC SURGERY | Facility: AMBULATORY SURGERY CENTER | Age: 53
End: 2023-08-15
Payer: COMMERCIAL

## 2023-08-15 ENCOUNTER — TRANSCRIPTION ENCOUNTER (OUTPATIENT)
Age: 53
End: 2023-08-15

## 2023-08-15 ENCOUNTER — OUTPATIENT (OUTPATIENT)
Dept: OUTPATIENT SERVICES | Facility: HOSPITAL | Age: 53
LOS: 1 days | Discharge: HOME | End: 2023-08-15

## 2023-08-15 VITALS
TEMPERATURE: 97 F | WEIGHT: 249.12 LBS | HEIGHT: 64 IN | DIASTOLIC BLOOD PRESSURE: 71 MMHG | SYSTOLIC BLOOD PRESSURE: 110 MMHG | HEART RATE: 69 BPM

## 2023-08-15 VITALS
SYSTOLIC BLOOD PRESSURE: 117 MMHG | OXYGEN SATURATION: 98 % | HEART RATE: 77 BPM | RESPIRATION RATE: 12 BRPM | DIASTOLIC BLOOD PRESSURE: 76 MMHG

## 2023-08-15 DIAGNOSIS — Z98.890 OTHER SPECIFIED POSTPROCEDURAL STATES: Chronic | ICD-10-CM

## 2023-08-15 LAB
GLUCOSE BLDC GLUCOMTR-MCNC: 78 MG/DL — SIGNIFICANT CHANGE UP (ref 70–99)
GLUCOSE BLDC GLUCOMTR-MCNC: 99 MG/DL — SIGNIFICANT CHANGE UP (ref 70–99)

## 2023-08-15 PROCEDURE — 29848 WRIST ENDOSCOPY/SURGERY: CPT | Mod: RT

## 2023-08-15 RX ADMIN — CHLORHEXIDINE GLUCONATE 1 APPLICATION(S): 213 SOLUTION TOPICAL at 11:10

## 2023-08-15 NOTE — ASU PREOP CHECKLIST - 2.
Pt on ozempic. last taken 2 days ago. MD anesthesia Noah notified. Pt on ozempic. last taken 2 days ago. MD anesthesia Eboni notified.

## 2023-08-15 NOTE — ASU DISCHARGE PLAN (ADULT/PEDIATRIC) - ASU DC SPECIAL INSTRUCTIONSFT
Co-Directors: Waqar Nixon MD; MD Nba Ramírez MD   [facility] [place] [facility] of [address] Floor 	  [place] [address] 	 	  Phone [phone] (HAND), Fax [phone]    [email or web address]    Hand Surgery Post Operative Instructions      DRESSING CARE:  1.Please keep bandage ON and DRY until you return to the office for your 1st postoperative visit.   2.In the shower you must cover bandage with a plastic bag. You can use tape or a rubber band so no water leaks into the bag.   3.Please do not exercise as that leads to excessive sweating as the bandage will therefore become moist/ wet.    4.Do not remove or change your bandage. You may apply more tape if dressing starts to unravel.   5.Please do not insert any objects, such as a pencil, down into the bandage.     ELEVATION:  1.Keep hand/wrist above heart level at all times or until bandage feels loose. This will help with swelling of the fingers and can be accomplished by using the FOAM PILLOW.   2.A sling will not hold your hand/wrist above your heart and therefore its use should be limited (it may also cause shoulder stiffness).     ACTIVITY:  1.Moving your fingers daily after surgery is very important to prevent stiffness. Please open your fingers completely and close your fingers completely to achieve full range of motion.  **UNLESS TENDON REPAIR OR NERVE REPAIR SURGERY PERFORMED    2.Move all joints of the extremity that are not immobilized to prevent stiffness (i.e. shoulder, elbow, fingers, and thumb unless instructed otherwise).   3.Avoid activities which may re-injure your hand or finger.     DIET:   Regular diet. Start light and progress as tolerated.     PAIN MEDICINE:   1.Pain medicine was sent to your pharmacy.  2.Take pain medicine on an “AS NEEDED” basis according to your doctor’s instructions.   3.Your pain will decrease over the next few days allowing you to:   •Decrease your pain medicine quantity until you stop.   •Increase the time between doses until you stop.   4.You should not drink alcoholic beverages while on pain medication.   5.Take pain medicine with food to prevent nausea.   6.Constipation can occur. If no bowel movement occurs within 48 hours take a laxative of your choice (over the counter).	 	      CONTACT PHYSICIAN FOR:   Slight pain, swelling and bluish discoloration are to be expected. If you have breathing difficulty or chest pain dial 911 immediately. However, if the following symptoms occur notify your physician:   •Temperature above 101° F 	        • Inability to urinate in 8 hours   •Uncontrolled nausea/vomiting   	  • Progressively increasing pain   •Signs of wound infection 	(Redness, swelling, pus-like drainage)                 • Excessive bleeding  • Increasing numbness   •Excessive swelling and tightness 	  • Splint or cast that is too tight      OFFICE APPOINTMENT:    A staff member from the office will call you in the next 1-2 days to schedule your 1st Post Operative appointment to see your physician back in the office. *IF someone does not reach out to you in the next 1-2 days please call the office.

## 2023-08-15 NOTE — ASU DISCHARGE PLAN (ADULT/PEDIATRIC) - CARE PROVIDER_API CALL
Chalino Tesfaye  [facility]  [address], [address] [place] [address]  Phone: [phone]  Fax: [phone]  Follow Up Time:

## 2023-08-23 ENCOUNTER — APPOINTMENT (OUTPATIENT)
Dept: ORTHOPEDIC SURGERY | Facility: CLINIC | Age: 53
End: 2023-08-23
Payer: COMMERCIAL

## 2023-08-23 PROCEDURE — 99024 POSTOP FOLLOW-UP VISIT: CPT

## 2023-08-23 NOTE — HISTORY OF PRESENT ILLNESS
[Clean/Dry/Intact] : clean, dry and intact [Healed] : healed [Erythema] : not erythematous [Discharge] : absent of discharge [Swelling] : not swollen [Neuro Intact] : an unremarkable neurological exam [Doing Well] : is doing well [Excellent Pain Control] : has excellent pain control [No Sign of Infection] : is showing no signs of infection [de-identified] : DOS: 8/15/2023 [de-identified] : incision healed well, no tinnels, palmar cutaneous intact, APB 5/5. Full digital ROM. no swelling.  [de-identified] : 8 days s/p right endoscopic carpal tunnel release. Patient notes improvement in numbness and tingling and night time symptoms.  [de-identified] : 8 days s/p right endoscopic carpal tunnel release.  [de-identified] : Patient will begin home program. She wants to do her left carpal tunnel release next week. Patient has a left volar radial wrist mass consistent with volar ganglion cyst. She will return for evaluation on Monday 8/28/2023 for left wrist mass.

## 2023-08-28 RX ORDER — SEMAGLUTIDE 0.5 MG/.5ML
0.5 INJECTION, SOLUTION SUBCUTANEOUS
Qty: 3 | Refills: 1 | Status: DISCONTINUED | COMMUNITY
Start: 2023-06-30 | End: 2023-08-28

## 2023-08-29 RX ORDER — CHLORHEXIDINE GLUCONATE 213 G/1000ML
1 SOLUTION TOPICAL ONCE
Refills: 0 | Status: COMPLETED | OUTPATIENT
Start: 2023-08-31 | End: 2023-08-31

## 2023-08-30 ENCOUNTER — TRANSCRIPTION ENCOUNTER (OUTPATIENT)
Age: 53
End: 2023-08-30

## 2023-08-30 ENCOUNTER — APPOINTMENT (OUTPATIENT)
Dept: ORTHOPEDIC SURGERY | Facility: CLINIC | Age: 53
End: 2023-08-30
Payer: COMMERCIAL

## 2023-08-30 PROCEDURE — 99214 OFFICE O/P EST MOD 30 MIN: CPT | Mod: 24

## 2023-08-30 PROCEDURE — 76882 US LMTD JT/FCL EVL NVASC XTR: CPT

## 2023-08-30 NOTE — ASU PATIENT PROFILE, ADULT - BILL OF RIGHTS/ADMISSION INFORMATION PROVIDED TO:
"Pharmacy Chemotherapy Verification  Patient Name: Carlo Lew  Dx: Clear cell renal carcinoma  Cycle: 2 (Previous treatment = 11/1/19)    Regimen and Dosing Reference  Nivolumab 3 mg/kg IV over 30 minutes  Ipilimumab 1 mg/kg IV over 30 minutes on Day 1  21 day cycle for 4 cycles followed by  Nivolumab 240 mg IV over 30 minutes on Day 1  14 day cycle until disease progression or unacceptable toxicity  OR  Nivolumab 480 mg IV over 30 minutes on Day 1  28 day cycle until disease progression or unacceptable toxicity  NCCN Guidelines for Kidney.V.1.2020  Erendira BAE, et al. J Clin Oncol. 2017;35(34):9674-1131  Keshia MASTERS et al. N Engl J Med 2018;378(14):1953-2003    Allergies:Patient has no known allergies.  /83   Pulse 95   Temp 36.6 °C (97.9 °F) (Temporal)   Resp 18   Ht 1.755 m (5' 9.09\") Comment: took 1 inch off of shoes  Wt 118.6 kg (261 lb 7.5 oz)   SpO2 92%   BMI 38.51 kg/m²   Body surface area is 2.4 meters squared.    Labs from 11/22/19 reviewed - all within treatment parameters. TSH/T4 WNL    Nivolumab 3 mg/kg x 118.6 kg = 355.8 mg   rounded to vial size (within 10%) per RX protocol = 340 mg IV    Ipilimumab 1 mg/kg x 118.6 kg = 118.6 mg   <10% difference, okay to treat with final dose = 118.5 mg IV    Ann-Marie Aguilera, PharmD, BCOP        " Patient

## 2023-08-30 NOTE — ASU PATIENT PROFILE, ADULT - NSICDXPASTSURGICALHX_GEN_ALL_CORE_FT
PAST SURGICAL HISTORY:  S/P carpal tunnel release right wrist    S/P knee surgery left     PAST SURGICAL HISTORY:  History of  section     S/P carpal tunnel release right wrist    S/P knee surgery left

## 2023-08-30 NOTE — ASU PATIENT PROFILE, ADULT - NSICDXPASTMEDICALHX_GEN_ALL_CORE_FT
PAST MEDICAL HISTORY:  Pituitary adenoma      PAST MEDICAL HISTORY:  Pituitary adenoma     Polycystic ovarian syndrome      PAST MEDICAL HISTORY:  Eczema     Pituitary adenoma     Polycystic ovarian syndrome

## 2023-08-30 NOTE — HISTORY OF PRESENT ILLNESS
[2] : the patient reports pain that is 2/10 in severity [de-identified] : DOS-8/15/23 [de-identified] : 2 weeks 1 day s/p Right endoscopic carpal tunnel decompression.  Therapy: Patient is only doing HEP. Patient states her symptoms are gradually improving.  Patient notes carpal tunnel symptoms improved on the right.  Scheduled for left endoscopic carpal tunnel decompression tomorrow. [de-identified] : Incision well-healed on the right.  Excellent strength of abductor pollicis brevis on the right.  There is a cystic mass over the volar radial left wrist Emilio's test reveals patent radial and ulnar vessel.  Positive carpal tunnel compression test.  4-5 abductor pollicis brevis [de-identified] : Ultrasound today of the left wrist demonstrates a hypoechoic mass [de-identified] : 2 weeks 1 day s/p Right endoscopic carpal tunnel decompression. [de-identified] : Patient with a minimally or asymptomatic left volar ganglion cyst.  Scheduled for endoscopic carpal tunnel decompression tomorrow.  She would like to undergo left endoscopic carpal tunnel decompression combined with left volar ganglion cyst aspiration  The risks benefits and alternatives were discussed with the patient including, but not limited to:   infection, nerve injury, pillar pain, CRPS, anesthetic block injury, persistent symptoms, scar sensitivity, development of a trigger digit, recurrence, incomplete release, etc.  The patient would like to proceed with surgery.  Shared decision-making was undertaken  Also discussed the risk of the ganglion recurring which is likely possible requiring repeat aspiration or surgical excision in the future

## 2023-08-30 NOTE — ASU PATIENT PROFILE, ADULT - PAIN SCALE PREFERRED, PROFILE
Fatuma Hendrix is a 27 year old,  who presents for her annual GYN exam.  Needs depo today.      SUBJECTIVE:  Past Medical History:   Diagnosis Date   • Acute endometritis    • Bacterial vaginosis    • Migraine    • UTI (urinary tract infection)    • Yeast dermatitis        Family History   Problem Relation Age of Onset   • Hypertension Mother    • Cancer Father         prostrate   • Myocardial Infarction Maternal Grandfather         Past Surgical History:   Procedure Laterality Date   • Past surgical history  1999    hypoglossal duct cyst removal    • Past surgical history       as a child duct cyct removed       Current Outpatient Medications   Medication Sig Dispense Refill   • MedroxyPROGESTERone Acetate (DEPO-PROVERA IM)      • busPIRone (BUSPAR) 15 MG tablet Take 1 tablet by mouth 2 times daily. 60 tablet 3     No current facility-administered medications for this visit.        ALLERGIES:  No Known Allergies    Social History     Socioeconomic History   • Marital status: Single     Spouse name: Not on file   • Number of children: 1   • Years of education: Not on file   • Highest education level: Not on file   Occupational History   • Occupation: Housekeeping     Employer: [employer]   Social Needs   • Financial resource strain: Not on file   • Food insecurity:     Worry: Not on file     Inability: Not on file   • Transportation needs:     Medical: Not on file     Non-medical: Not on file   Tobacco Use   • Smoking status: Never Smoker   • Smokeless tobacco: Never Used   Substance and Sexual Activity   • Alcohol use: No   • Drug use: No   • Sexual activity: Yes     Partners: Male     Birth control/protection: Injection   Lifestyle   • Physical activity:     Days per week: Not on file     Minutes per session: Not on file   • Stress: Not on file   Relationships   • Social connections:     Talks on phone: Not on file     Gets together: Not on file     Attends Confucianist service: Not  on file     Active member of club or organization: Not on file     Attends meetings of clubs or organizations: Not on file     Relationship status: Not on file   • Intimate partner violence:     Fear of current or ex partner: Not on file     Emotionally abused: Not on file     Physically abused: Not on file     Forced sexual activity: Not on file   Other Topics Concern   • Not on file   Social History Narrative   • Not on file     All past medical, surgical, social and family history reviewed and updated.      REVIEW OF SYSTEMS:  I have personally reviewed the ROS as entered by the medical assistant, and have addressed pertinent issues.    OBJECTIVE:  Visit Vitals  /74 (BP Location: [facility], Patient Position: Sitting, Cuff Size: Regular)   Pulse 79   Ht 5' 10\" (1.778 m)   Wt 92.5 kg   LMP 05/27/2019 (Exact Date)   BMI 29.27 kg/m²     Patient's last menstrual period was 05/27/2019 (exact date).  GENERAL:  No acute distress, alert and oriented times 3, mood appropriate.  NECK:  No thyromegaly.  LUNGS:  Clear to auscultation.  HEART:  Regular rate and rhythm.  BREASTS:  Without masses or nipple discharge bilaterally.    ABDOMEN:  Soft and nontender without masses or hepatomegaly.  EXTREMITIES:  Nontender.  No edema.  LYMPHATIC:  No palpable neck or groin lymph nodes.    PELVIC:  External genitalia normal.         Bartholin's glands, urethra, [place]'s glands are normal.        Urethral meatus normal.        Bladder normal.        Vagina normal.                  Uterus normal.        Adnexa normal.        Anus and perineum normal.         Cervix normal.           IMPRESSION:  Normal GYN exam  Std screen    EDUCATION:  Reviewed health maintenance including diet, regular exercise, periodic exams and age appropriate screening tests.    PLAN:  Pap smear:  performed today.  Medications:  depoprovera.  Mammogram:  not needed.  Labs:  not needed.  Sexually transmitted disease screening:  chlamydia and gc.  Return to clinic:  1  Year.     numerical 0-10

## 2023-08-30 NOTE — ASU PATIENT PROFILE, ADULT - NS PREOP UNDERSTANDS INFO
Time by MD, As per MD nothing to eat or drink after midnight; patient reminded to come with photo ID/insurance card; dress in comfortable clothes; no jewelries/contact lens/valuables; no smoking/alcohol drinking/recreational drug use today; escort must have a photo ID; address and callback number was given./yes

## 2023-08-31 ENCOUNTER — OUTPATIENT (OUTPATIENT)
Dept: OUTPATIENT SERVICES | Facility: HOSPITAL | Age: 53
LOS: 1 days | Discharge: HOME | End: 2023-08-31

## 2023-08-31 ENCOUNTER — APPOINTMENT (OUTPATIENT)
Dept: ORTHOPEDIC SURGERY | Facility: AMBULATORY SURGERY CENTER | Age: 53
End: 2023-08-31
Payer: COMMERCIAL

## 2023-08-31 ENCOUNTER — TRANSCRIPTION ENCOUNTER (OUTPATIENT)
Age: 53
End: 2023-08-31

## 2023-08-31 VITALS
HEART RATE: 66 BPM | HEIGHT: 64 IN | WEIGHT: 245.37 LBS | RESPIRATION RATE: 20 BRPM | TEMPERATURE: 97 F | SYSTOLIC BLOOD PRESSURE: 106 MMHG | OXYGEN SATURATION: 100 % | DIASTOLIC BLOOD PRESSURE: 67 MMHG

## 2023-08-31 VITALS
RESPIRATION RATE: 16 BRPM | OXYGEN SATURATION: 100 % | SYSTOLIC BLOOD PRESSURE: 105 MMHG | DIASTOLIC BLOOD PRESSURE: 67 MMHG | TEMPERATURE: 97 F | HEART RATE: 76 BPM

## 2023-08-31 DIAGNOSIS — Z87.42 PERSONAL HISTORY OF OTHER DISEASES OF THE FEMALE GENITAL TRACT: Chronic | ICD-10-CM

## 2023-08-31 DIAGNOSIS — Z98.891 HISTORY OF UTERINE SCAR FROM PREVIOUS SURGERY: Chronic | ICD-10-CM

## 2023-08-31 DIAGNOSIS — Z98.890 OTHER SPECIFIED POSTPROCEDURAL STATES: Chronic | ICD-10-CM

## 2023-08-31 DIAGNOSIS — E28.2 POLYCYSTIC OVARIAN SYNDROME: Chronic | ICD-10-CM

## 2023-08-31 PROCEDURE — 20612 ASPIRATE/INJ GANGLION CYST: CPT | Mod: 79,LT

## 2023-08-31 PROCEDURE — 29848 WRIST ENDOSCOPY/SURGERY: CPT | Mod: 79,LT

## 2023-08-31 RX ORDER — SODIUM CHLORIDE 9 MG/ML
500 INJECTION, SOLUTION INTRAVENOUS
Refills: 0 | Status: DISCONTINUED | OUTPATIENT
Start: 2023-08-31 | End: 2023-08-31

## 2023-08-31 RX ORDER — FENTANYL CITRATE 50 UG/ML
25 INJECTION INTRAVENOUS
Refills: 0 | Status: DISCONTINUED | OUTPATIENT
Start: 2023-08-31 | End: 2023-08-31

## 2023-08-31 RX ADMIN — SODIUM CHLORIDE 100 MILLILITER(S): 9 INJECTION, SOLUTION INTRAVENOUS at 12:35

## 2023-08-31 NOTE — BRIEF OPERATIVE NOTE - NSICDXBRIEFPOSTOP_GEN_ALL_CORE_FT
POST-OP DIAGNOSIS:  Left carpal tunnel syndrome 31-Aug-2023 14:47:18  Reji Schmid  Ganglion cyst of volar aspect of left wrist 31-Aug-2023 14:47:28  Reji Schmid

## 2023-08-31 NOTE — BRIEF OPERATIVE NOTE - NSICDXBRIEFPROCEDURE_GEN_ALL_CORE_FT
PROCEDURES:  Endoscopic carpal tunnel release of left wrist 31-Aug-2023 14:46:27  Reji Schmid  Aspiration of ganglion cyst 31-Aug-2023 14:46:43  Reji Schmid

## 2023-08-31 NOTE — ASU DISCHARGE PLAN (ADULT/PEDIATRIC) - CARE PROVIDER_API CALL
Chalino Tesfaye  [facility]  [address], [address] [place] [address]  Phone: [phone]  Fax: [phone]  Follow Up Time:

## 2023-08-31 NOTE — PRE-ANESTHESIA EVALUATION ADULT - NSANTHSNORERD_ENT_A_CORE
Patient was injected with 27.3 millicuries 55JZA Sestamibi on at 0725. Patient was injected with 36.9 millicuries 09ULP Sestamibi on  at 0920. Patient's armbands were removed and placed in shred-it box.     Patient had a Nuclear Lexiscan Stress Test. Yes

## 2023-08-31 NOTE — ASU DISCHARGE PLAN (ADULT/PEDIATRIC) - NS MD DC FALL RISK RISK
For information on Fall & Injury Prevention, visit: [email or web address] OR  [email or web address] OR  https://www.cdc.gov/steadi/patient.html

## 2023-08-31 NOTE — ASU DISCHARGE PLAN (ADULT/PEDIATRIC) - CALL YOUR DOCTOR IF YOU HAVE ANY OF THE FOLLOWING:
Bleeding that does not stop/Pain not relieved by Medications/Fever greater than (need to indicate Fahrenheit or Celsius) R knee grossly 3/5, all other extremities WFL.

## 2023-08-31 NOTE — BRIEF OPERATIVE NOTE - NSICDXBRIEFPREOP_GEN_ALL_CORE_FT
PRE-OP DIAGNOSIS:  Left carpal tunnel syndrome 31-Aug-2023 14:46:54  Reji Schmid  Ganglion cyst of volar aspect of wrist 31-Aug-2023 14:47:01  Reji Schmid

## 2023-08-31 NOTE — ASU PREOP CHECKLIST - LATEX ALLERGY
Call Jason about the medication request form
Spoke with Jason at [facility] and PA for clonidine ER has been approved.
no

## 2023-08-31 NOTE — ASU DISCHARGE PLAN (ADULT/PEDIATRIC) - ASU DC SPECIAL INSTRUCTIONSFT
Co-Directors: Waqar Nixon MD; MD Nba Ramírez MD   [facility] [place] [facility] [address] [place] 	  [place] [address] 	 	  Phone [phone] (HAND), Fax [phone]    [email or web address]    Hand Surgery Post Operative Instructions      DRESSING CARE:  1.	Please keep bandage ON and DRY until you return to the office for your 1st postoperative visit.   2.	In the shower you must cover bandage with a plastic bag. You can use tape or a rubber band so no water leaks into the bag.   3.	Please do not exercise as that leads to excessive sweating as the bandage will therefore become moist/ wet.    4.	Do not remove or change your bandage. You may apply more tape if dressing starts to unravel.   5.	Please do not insert any objects, such as a pencil, down into the bandage.     ELEVATION:  1.	Keep hand/wrist above heart level at all times or until bandage feels loose. This will help with swelling of the fingers and can be accomplished by using the FOAM PILLOW.   2.	 A sling will not hold your hand/wrist above your heart and therefore its use should be limited (it may also cause shoulder stiffness).     ACTIVITY:  1.	Moving your fingers daily after surgery is very important to prevent stiffness. Please open your fingers completely and close your fingers completely to achieve full range of motion.  **UNLESS TENDON REPAIR OR NERVE REPAIR SURGERY PERFORMED    2.	Move all joints of the extremity that are not immobilized to prevent stiffness (i.e. shoulder, elbow, fingers, and thumb unless instructed otherwise).   3.	Avoid activities which may re-injure your hand or finger.     DIET:   Regular diet. Start light and progress as tolerated.     PAIN MEDICINE:   1.	Pain medicine was sent to your pharmacy.  2.	Take pain medicine on an “AS NEEDED” basis according to your doctor’s instructions.   3.	Your pain will decrease over the next few days allowing you to:   •	Decrease your pain medicine quantity until you stop.   •	Increase the time between doses until you stop.   4.	You should not drink alcoholic beverages while on pain medication.   5.	Take pain medicine with food to prevent nausea.   6.	Constipation can occur. If no bowel movement occurs within 48 hours take a laxative of your choice (over the counter).	 	      CONTACT PHYSICIAN FOR:   Slight pain, swelling and bluish discoloration are to be expected. If you have breathing difficulty or chest pain dial 911 immediately. However, if the following symptoms occur notify your physician:   •	Temperature above 101° F 	        • Inability to urinate in 8 hours   •	Uncontrolled nausea/vomiting   	• Progressively increasing pain   •	Signs of wound infection 	                • Excessive bleeding  (Redness, swelling, pus-like drainage)     • Increasing numbness   •	Excessive swelling and tightness 	• Splint or cast that is too tight      OFFICE APPOINTMENT:    A staff member from the office will call you in the next 1-2 days to schedule your 1st Post Operative appointment to see your physician back in the office. *IF someone does not reach out to you in the next 1-2 days please call the office.      Patient Name _________________________________ Signature ______________________________ Date__________    Witness _____________________________________________________ Date ______________

## 2023-09-04 RX ORDER — CABERGOLINE 0.5 MG/1
1 TABLET ORAL
Refills: 0 | DISCHARGE

## 2023-09-04 RX ORDER — SEMAGLUTIDE 0.68 MG/ML
0.5 INJECTION, SOLUTION SUBCUTANEOUS
Refills: 0 | DISCHARGE

## 2023-09-08 ENCOUNTER — APPOINTMENT (OUTPATIENT)
Dept: ORTHOPEDIC SURGERY | Facility: CLINIC | Age: 53
End: 2023-09-08
Payer: COMMERCIAL

## 2023-09-08 DIAGNOSIS — G56.01 CARPAL TUNNEL SYNDROME, RIGHT UPPER LIMB: ICD-10-CM

## 2023-09-08 DIAGNOSIS — G56.02 CARPAL TUNNEL SYNDROME, LEFT UPPER LIMB: ICD-10-CM

## 2023-09-08 DIAGNOSIS — M67.432 GANGLION, LEFT WRIST: ICD-10-CM

## 2023-09-08 PROCEDURE — 99024 POSTOP FOLLOW-UP VISIT: CPT

## 2023-10-06 ENCOUNTER — APPOINTMENT (OUTPATIENT)
Dept: ENDOCRINOLOGY | Facility: CLINIC | Age: 53
End: 2023-10-06

## 2023-12-08 PROBLEM — L30.9 DERMATITIS, UNSPECIFIED: Chronic | Status: ACTIVE | Noted: 2023-08-31

## 2023-12-08 PROBLEM — D35.2 BENIGN NEOPLASM OF PITUITARY GLAND: Chronic | Status: ACTIVE | Noted: 2023-08-14

## 2023-12-08 PROBLEM — E28.2 POLYCYSTIC OVARIAN SYNDROME: Chronic | Status: ACTIVE | Noted: 2023-08-31

## 2023-12-08 RX ORDER — SEMAGLUTIDE 1 MG/.5ML
1 INJECTION, SOLUTION SUBCUTANEOUS
Qty: 3 | Refills: 3 | Status: DISCONTINUED | COMMUNITY
Start: 2023-08-21 | End: 2023-12-08

## 2023-12-08 RX ORDER — SEMAGLUTIDE 2.4 MG/.75ML
2.4 INJECTION, SOLUTION SUBCUTANEOUS
Qty: 3 | Refills: 3 | Status: ACTIVE | COMMUNITY
Start: 2023-12-08 | End: 1900-01-01

## 2024-01-05 ENCOUNTER — APPOINTMENT (OUTPATIENT)
Dept: ENDOCRINOLOGY | Facility: CLINIC | Age: 54
End: 2024-01-05
Payer: COMMERCIAL

## 2024-01-05 VITALS
HEART RATE: 93 BPM | SYSTOLIC BLOOD PRESSURE: 120 MMHG | DIASTOLIC BLOOD PRESSURE: 79 MMHG | WEIGHT: 241 LBS | BODY MASS INDEX: 41.37 KG/M2 | TEMPERATURE: 96.3 F | OXYGEN SATURATION: 96 %

## 2024-01-05 DIAGNOSIS — E55.9 VITAMIN D DEFICIENCY, UNSPECIFIED: ICD-10-CM

## 2024-01-05 DIAGNOSIS — L68.0 HIRSUTISM: ICD-10-CM

## 2024-01-05 DIAGNOSIS — G56.01 CARPAL TUNNEL SYNDROME, RIGHT UPPER LIMB: ICD-10-CM

## 2024-01-05 PROCEDURE — 99215 OFFICE O/P EST HI 40 MIN: CPT

## 2024-01-06 RX ORDER — SEMAGLUTIDE 1.7 MG/.75ML
1.7 INJECTION, SOLUTION SUBCUTANEOUS
Qty: 3 | Refills: 1 | Status: DISCONTINUED | COMMUNITY
Start: 2023-10-30 | End: 2024-01-06

## 2024-01-06 RX ORDER — CABERGOLINE 0.5 MG/1
0.5 TABLET ORAL
Qty: 39 | Refills: 0 | Status: DISCONTINUED | COMMUNITY
Start: 2017-10-13 | End: 2024-01-06

## 2024-01-06 RX ORDER — HYDROCODONE BITARTRATE AND ACETAMINOPHEN 5; 325 MG/1; MG/1
5-325 TABLET ORAL
Qty: 10 | Refills: 0 | Status: DISCONTINUED | COMMUNITY
Start: 2023-08-30 | End: 2024-01-06

## 2024-01-06 RX ORDER — SEMAGLUTIDE 0.68 MG/ML
2 INJECTION, SOLUTION SUBCUTANEOUS
Qty: 3 | Refills: 3 | Status: DISCONTINUED | COMMUNITY
Start: 2023-07-07 | End: 2024-01-06

## 2024-01-06 RX ORDER — OXYCODONE AND ACETAMINOPHEN 5; 325 MG/1; MG/1
5-325 TABLET ORAL
Qty: 7 | Refills: 0 | Status: DISCONTINUED | COMMUNITY
Start: 2023-08-14 | End: 2024-01-06

## 2024-01-06 NOTE — PHYSICAL EXAM
[Alert] : alert [No Acute Distress] : no acute distress [Normal Sclera/Conjunctiva] : normal sclera/conjunctiva [EOMI] : extra ocular movement intact [PERRL] : pupils equal, round and reactive to light [No Proptosis] : no proptosis [No Lid Lag] : no lid lag [Normal Outer Ear/Nose] : the ears and nose were normal in appearance [Normal Hearing] : hearing was normal [No Neck Mass] : no neck mass was observed [No LAD] : no lymphadenopathy [Thyroid Not Enlarged] : the thyroid was not enlarged [No Thyroid Nodules] : no palpable thyroid nodules [Clear to Auscultation] : lungs were clear to auscultation bilaterally [No Murmurs] : no murmurs [Normal Rate] : heart rate was normal [Regular Rhythm] : with a regular rhythm [Carotids Normal] : carotid pulses were normal with no bruits [No Edema] : no peripheral edema [Not Tender] : non-tender [Soft] : abdomen soft [Normal Supraclavicular Nodes] : no supraclavicular lymphadenopathy [Normal Anterior Cervical Nodes] : no anterior cervical lymphadenopathy [No CVA Tenderness] : no ~M costovertebral angle tenderness [Normal Gait] : normal gait [No Involuntary Movements] : no involuntary movements were seen [Normal Strength/Tone] : muscle strength and tone were normal [No Rash] : no rash [Hirsutism] : hirsutism present [No Tremors] : no tremors [Normal Sensation on Monofilament Testing] : normal sensation on monofilament testing of lower extremities [Normal Affect] : the affect was normal [Normal Mood] : the mood was normal [Kyphosis] : no kyphosis present [Abdominal Striae] : no abdominal striae [Foot Ulcers] : no foot ulcers [Acne] : no acne [de-identified] : Markedly obese [de-identified] : Faint corneal arcus.  Visual fields normal to confrontational testing [de-identified] : DP pulses non-palpable bilaterally, but capillary refill is normal [de-identified] : Difficult to feel a distinct liver edge given her obesity [de-identified] : Moderate swelling of both knees.   strength grossly normal [de-identified] : Mild acanthosis in the posterior cervical area.  Moderate fronto-temporal scalp hair loss.  Moderate facial hirsutism along the chin [de-identified] : Vibratory sensation mildly decreased over the toes

## 2024-01-06 NOTE — REVIEW OF SYSTEMS
[Decreased Appetite] : decreased appetite [Acne] : acne [Dry Skin] : dry skin [Hirsutism] : hirsutism [Hair Loss] : hair loss [Stress] : stress [Heat Intolerance] : heat intolerance [Cold Intolerance] : cold intolerance [Fatigue] : no fatigue [Fever] : no fever [Chills] : no chills [Dry Eyes] : no dryness [Blurred Vision] : no blurred vision [Eyes Itch] : no itch [Dysphagia] : no dysphagia [Hearing Loss] : no hearing loss  [Chest Pain] : no chest pain [Palpitations] : no palpitations [Lower Ext Edema] : no lower extremity edema [Shortness Of Breath] : no shortness of breath [Cough] : no cough [Wheezing] : no wheezing [Nausea] : no nausea [Constipation] : no constipation [Heartburn] : no heartburn [Diarrhea] : no diarrhea [Polyuria] : no polyuria [Dysuria] : no dysuria [Muscle Weakness] : no muscle weakness [Myalgia] : no myalgia  [Muscle Cramps] : no muscle cramps [Headaches] : no headaches [Difficulty Walking] : no difficulty walking [Tremors] : no tremors [Depression] : no depression [Insomnia] : no insomnia [Anxiety] : no anxiety [Polydipsia] : no polydipsia [Hot Flashes] : no hot flashes [Easy Bleeding] : no ~M tendency for easy bleeding [Easy Bruising] : no tendency for easy bruising [FreeTextEntry2] : Has lost 5 lb since her last visit [FreeTextEntry6] : RESENDIZ only with stairs [FreeTextEntry8] : Has been amenorrheic since last March.  Nocturia once a night [FreeTextEntry9] : Pain and swelling in both knees, L > R.  See comments in the HPI re: carpal tunnel surgery [de-identified] : Haa paresthesias and mild prurutis (without a rash) over the anterior thigh of her R leg [de-identified] : Significant hot flashes

## 2024-01-06 NOTE — ASSESSMENT
[FreeTextEntry2] : Diet, LDL targets [FreeTextEntry1] : 1) Obesity:  Although she has noted additional appetite suppression and early satiety on the higher dose of Wegovy, she has lost only 5 lb since her last visit--likely because her diet in the past two months seems to have shifted to higher fat foods.  We therefore discussed her diet in detail: --Needs to shift from eggs at breakfast to low-fat yogurt with some type of whole-grain cereal with fruit.  (The eggs may also be contributing to her elevated LDL-cholesterol).  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  Her current LDL-cholesterol level is also somewhat above many of her previous values, but her diet during the past few weeks has been higher in saturated fat--beef (albeit modest portions) more than twice a week, more fried foods (during the holidays and when she is traveling on business) and a rather high intake of eggs.   She has no family history of premature CAD, and is not aware of any family history of hyperlipidemia, but she will check.  The current lipid profile does suggest a strong genetic predisposition. --Diet was discussed in detail, with the necessary modifications for her hyperlipidemia being almost identical to those already needed for her obesity.   --She will increase her intake of fish back to 3 days/week, decrease beef to 1-2X/week --Eliminate the fried foods --To decrease the frequency of eggs at breakfast to 2 days/week or change to egg-white omelets with vegetables  3) Hirsutism:  Persists without change.  The current bloodwork did not include a testosterone level but most of her values in the past few years have been normal.  She would like to re-try spironolactone, which she stopped after the last trial due to nausea from the drug. --To restart the spironolactone at 50 mg/day for 2 weeks, then 100 mg/day if tolerated.  To take the medication with her main meal.  Will need to repeat a potassium level on the higher dose.  4) Hyperprolactinemia:  Serum prolactin level remains normal off cabergoline.  Her last MRI in May of 2022 was unable to identify a discrete adenoma.  She is amenorrheic, but this is almost certainly due to menopause. --Continue off cabergoline, and monitor serial PRL levels.  If the PRL level begins to rise will repeat the MRI and restart the cabergoline. --Will consider repeating the MRI regardless later this year  5) Vitamin D deficiency:  25-D level is extremely low with her having lapsed on supplementation. --Restart vitamin D at 4000 units/day for 2 months, then continue at 2000 units/day thereafter  See for follow-up in 3-4 months.  CMP, lipids, total/bioavail testosterone, PRL before the visit

## 2024-01-06 NOTE — DATA REVIEWED
[FreeTextEntry1] : [employer]  (1/3/24)  FBS 91,  A1c level not done CMP WNL , HDL 64, TG 80 TSH level normal at 1.29 uU/ml Prolactin 16.4 ng/ml  (2-20) 25-D level extremely low at 17 ng/ml

## 2024-01-06 NOTE — HISTORY OF PRESENT ILLNESS
[FreeTextEntry1] : 53-year-old woman who is followed for hyperprolactinemia, PCOS and obesity.  She was initially diagnosed with hyperprolactinemia and a pituitary microadenoma in 2002, with the adenoma believed to be prolactin-secreting.  She has been on cabergoline therapy since that time, though with occasional interruptions in therapy and recurrence of the hyperprolactinemia during those intervals.  Her PRL levels have been more consistently controlled during the past few years, and she has been off the cabergoline since early 2023..  The adenoma had decreased from 8 mm to 4-5 mm in size on an MRI done in 2018, and could not be identified on the most recent MRI in 2022.   She is on metformin therapy for the PCOS, but has had persistent hirsutism despite normalization of her testosterone level.  She has been on Wegovy for treatment of her obesity since mid-2023  Increased her dose of Wegovy to 2.4 mg approximately 3 weeks ago.  Has noted a further decrease in appetite, but has still lost only 5 lb since her last visit.  Although she has eating less overall, she has been eating more eggs, fried foods, and beef.  She has also been traveling for work fairly frequently, and it is harder for her to find healthy and low-calorie food choices when eating on the road.   She does find that she can go longer intervals without eating, however, and also notes early satiety. Is still having two eggs most mornings for breakfast.  Lunch is variable, sometimes skipped. Protein at supper used to be fish 3X/week, but is now only once a week.  Has occasional headaches, but no visual disturbances. Hirsutism remains a significant problem, and she would like to make another attempt with the spironolactone.  (Had problems in the past with nausea from the drug) Had carpal tunnel surgery on both wrists (one month apart) with excellent results--complete disappearance of pain, though she still has less than optimal strength--says "I should be doing my exercises" LMP was March of 2022--is now approaching "official;" menopause. Has lapsed on her vitamin D supplementation. Family history was reviewed because of the hypercholesterolemia noted on her current bloodwork.  There does not appear to be any CAD in the family.  She thinks that her father is diabetic, but does not know whether he has hyperlipidemia.

## 2024-03-01 ENCOUNTER — NON-APPOINTMENT (OUTPATIENT)
Age: 54
End: 2024-03-01

## 2024-03-01 DIAGNOSIS — F41.9 ANXIETY DISORDER, UNSPECIFIED: ICD-10-CM

## 2024-03-01 RX ORDER — ESCITALOPRAM OXALATE 5 MG/1
5 TABLET ORAL DAILY
Qty: 30 | Refills: 11 | Status: ACTIVE | COMMUNITY
Start: 2024-03-01 | End: 1900-01-01

## 2024-03-31 ENCOUNTER — NON-APPOINTMENT (OUTPATIENT)
Age: 54
End: 2024-03-31

## 2024-04-01 ENCOUNTER — RX RENEWAL (OUTPATIENT)
Age: 54
End: 2024-04-01

## 2024-04-01 RX ORDER — SPIRONOLACTONE 50 MG/1
50 TABLET ORAL DAILY
Qty: 90 | Refills: 5 | Status: ACTIVE | COMMUNITY
Start: 2024-01-05 | End: 1900-01-01

## 2024-05-03 ENCOUNTER — APPOINTMENT (OUTPATIENT)
Dept: ENDOCRINOLOGY | Facility: CLINIC | Age: 54
End: 2024-05-03
Payer: COMMERCIAL

## 2024-05-03 VITALS
SYSTOLIC BLOOD PRESSURE: 106 MMHG | BODY MASS INDEX: 42 KG/M2 | OXYGEN SATURATION: 98 % | HEART RATE: 93 BPM | WEIGHT: 246 LBS | DIASTOLIC BLOOD PRESSURE: 67 MMHG | TEMPERATURE: 97.4 F | HEIGHT: 64 IN

## 2024-05-03 DIAGNOSIS — E66.9 OBESITY, UNSPECIFIED: ICD-10-CM

## 2024-05-03 DIAGNOSIS — D35.2 BENIGN NEOPLASM OF PITUITARY GLAND: ICD-10-CM

## 2024-05-03 DIAGNOSIS — E78.5 HYPERLIPIDEMIA, UNSPECIFIED: ICD-10-CM

## 2024-05-03 DIAGNOSIS — E22.9 BENIGN NEOPLASM OF PITUITARY GLAND: ICD-10-CM

## 2024-05-03 DIAGNOSIS — E28.2 POLYCYSTIC OVARIAN SYNDROME: ICD-10-CM

## 2024-05-03 PROCEDURE — G2211 COMPLEX E/M VISIT ADD ON: CPT | Mod: NC,1L

## 2024-05-03 PROCEDURE — 99214 OFFICE O/P EST MOD 30 MIN: CPT

## 2024-06-03 PROBLEM — E28.2 PCOS (POLYCYSTIC OVARIAN SYNDROME): Status: ACTIVE | Noted: 2018-05-08

## 2024-06-03 PROBLEM — D35.2 PITUITARY MICROADENOMA WITH HYPERPROLACTINEMIA: Status: ACTIVE | Noted: 2017-10-13

## 2024-06-03 PROBLEM — E66.9 OBESITY (BMI 35.0-39.9 WITHOUT COMORBIDITY): Status: ACTIVE | Noted: 2018-05-08

## 2024-06-03 PROBLEM — E78.5 HYPERLIPIDEMIA: Status: ACTIVE | Noted: 2022-05-10

## 2024-06-03 NOTE — DATA REVIEWED
[FreeTextEntry1] : [employer]  (5/2/24)  FBS 86,  A1c 5.5% CMP WNL , HDL 73, TG 59 CBC WNL Prolactin 13.0 ng/ml  (normal < 20) 25-D level below target range at 23 ng/ml

## 2024-06-03 NOTE — REVIEW OF SYSTEMS
[Decreased Appetite] : decreased appetite [Acne] : acne [Dry Skin] : dry skin [Hirsutism] : hirsutism [Hair Loss] : hair loss [Stress] : stress [Cold Intolerance] : cold intolerance [Heat Intolerance] : heat intolerance [Dry Eyes] : dryness [Fatigue] : no fatigue [Fever] : no fever [Chills] : no chills [Blurred Vision] : no blurred vision [Eyes Itch] : no itch [Dysphagia] : no dysphagia [Hearing Loss] : no hearing loss  [Chest Pain] : no chest pain [Palpitations] : no palpitations [Lower Ext Edema] : no lower extremity edema [Shortness Of Breath] : no shortness of breath [Cough] : no cough [Wheezing] : no wheezing [Nausea] : no nausea [Constipation] : no constipation [Heartburn] : no heartburn [Diarrhea] : no diarrhea [Polyuria] : no polyuria [Dysuria] : no dysuria [Muscle Weakness] : no muscle weakness [Myalgia] : no myalgia  [Muscle Cramps] : no muscle cramps [Headaches] : no headaches [Difficulty Walking] : no difficulty walking [Tremors] : no tremors [Depression] : no depression [Insomnia] : no insomnia [Anxiety] : no anxiety [Polydipsia] : no polydipsia [Hot Flashes] : no hot flashes [Easy Bleeding] : no ~M tendency for easy bleeding [Easy Bruising] : no tendency for easy bruising [FreeTextEntry2] : Has gained back the 5 lb which she had lost prior to her last visit [FreeTextEntry3] : 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  Has only occasional nocturia [FreeTextEntry9] : Pain and swelling in both knees, L > R. [de-identified] : Usually removes facial hair by tweezing [de-identified] : Has paresthesias and mild prurutis (without a rash) over the anterior thigh of her R leg [de-identified] : Significant hot flashes

## 2024-06-03 NOTE — PHYSICAL EXAM
[Alert] : alert [No Acute Distress] : no acute distress [Normal Sclera/Conjunctiva] : normal sclera/conjunctiva [EOMI] : extra ocular movement intact [PERRL] : pupils equal, round and reactive to light [No Proptosis] : no proptosis [No Lid Lag] : no lid lag [Normal Outer Ear/Nose] : the ears and nose were normal in appearance [Normal Hearing] : hearing was normal [No Neck Mass] : no neck mass was observed [No LAD] : no lymphadenopathy [Thyroid Not Enlarged] : the thyroid was not enlarged [No Thyroid Nodules] : no palpable thyroid nodules [Clear to Auscultation] : lungs were clear to auscultation bilaterally [No Murmurs] : no murmurs [Normal Rate] : heart rate was normal [Regular Rhythm] : with a regular rhythm [Carotids Normal] : carotid pulses were normal with no bruits [No Edema] : no peripheral edema [Not Tender] : non-tender [Soft] : abdomen soft [Normal Supraclavicular Nodes] : no supraclavicular lymphadenopathy [Normal Anterior Cervical Nodes] : no anterior cervical lymphadenopathy [No CVA Tenderness] : no ~M costovertebral angle tenderness [Normal Gait] : normal gait [No Involuntary Movements] : no involuntary movements were seen [Normal Strength/Tone] : muscle strength and tone were normal [No Rash] : no rash [Hirsutism] : hirsutism present [No Tremors] : no tremors [Normal Sensation on Monofilament Testing] : normal sensation on monofilament testing of lower extremities [Normal Affect] : the affect was normal [Normal Mood] : the mood was normal [Kyphosis] : no kyphosis present [Abdominal Striae] : no abdominal striae [Foot Ulcers] : no foot ulcers [Acne] : no acne [de-identified] : Markedly obese [de-identified] : Faint corneal arcus.  Visual fields normal to confrontational testing [de-identified] : Thyroid borderline increased in size, but without palpable nodularity [de-identified] : DP pulses non-palpable bilaterally, but capillary refill is normal [de-identified] : Moderate swelling of both knees.   strength grossly normal [de-identified] : Difficult to feel a distinct liver edge given her obesity [de-identified] : Vibratory sensation mildly decreased over the toes [de-identified] : Minimal acanthosis in the posterior cervical area.  Moderate fronto-temporal scalp hair loss.  Moderate facial hirsutism along the chin

## 2024-06-03 NOTE — HISTORY OF PRESENT ILLNESS
[FreeTextEntry1] : 53-year-old woman who is followed for hyperprolactinemia, PCOS and obesity.  She was initially diagnosed with hyperprolactinemia and a pituitary microadenoma in 2002, with the adenoma believed to be prolactin-secreting.  She has been on cabergoline therapy since that time, though with occasional interruptions in therapy and recurrence of the hyperprolactinemia during those intervals.  Her PRL levels have been more consistently controlled during the past few years, and she has been off the cabergoline since early 2023..  The adenoma had decreased from 8 mm to 4-5 mm in size on an MRI done in 2018, and could not be identified on the most recent MRI in 2022.   She is on metformin therapy for the PCOS, but has had persistent hirsutism despite normalization of her testosterone level.  She has been on Wegovy for treatment of her obesity since mid-2023  Has now been on the Wegovy at 2.4 mg/week for the past 4-5 months, but has lost only 2 lb in weight. Reports notable appetite suppression for the first 2 days after each dose, but the effect then wears off and she begins to "crave" sugar and salt.  (The salt craving is usually in the form of chips). 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  Has been skipping lunch, and when she does eat, she has not been having salads as often as before.  Protein at supper is usually chicken--has been eating fish fairly infrequently.   Is still putting 2-3 tsp of sugar in her 3 cups of coffee every day She has not yet checked on insurance coverage for Mounjaro.  Is tolerating the spironolactone at 50 mg BID, using the generic from the local pharmacy.  She did not yet try increasing the 150 mg/day. Her facial hirsutism is possibly a bit better even though she has not increased the spironolactone. Denies any headaches or visual disturbances

## 2024-07-22 ENCOUNTER — NON-APPOINTMENT (OUTPATIENT)
Age: 54
End: 2024-07-22

## 2024-09-27 ENCOUNTER — APPOINTMENT (OUTPATIENT)
Dept: ENDOCRINOLOGY | Facility: CLINIC | Age: 54
End: 2024-09-27

## 2024-09-27 VITALS
SYSTOLIC BLOOD PRESSURE: 119 MMHG | WEIGHT: 256 LBS | TEMPERATURE: 97.7 F | BODY MASS INDEX: 43.71 KG/M2 | DIASTOLIC BLOOD PRESSURE: 84 MMHG | OXYGEN SATURATION: 99 % | HEIGHT: 64 IN | HEART RATE: 76 BPM

## 2024-09-27 PROCEDURE — 99214 OFFICE O/P EST MOD 30 MIN: CPT

## 2024-09-27 PROCEDURE — G2211 COMPLEX E/M VISIT ADD ON: CPT | Mod: NC

## 2024-09-27 NOTE — PHYSICAL EXAM
[Alert] : alert [No Acute Distress] : no acute distress [Normal Sclera/Conjunctiva] : normal sclera/conjunctiva [EOMI] : extra ocular movement intact [PERRL] : pupils equal, round and reactive to light [No Proptosis] : no proptosis [No Lid Lag] : no lid lag [Normal Outer Ear/Nose] : the ears and nose were normal in appearance [Normal Hearing] : hearing was normal [No Neck Mass] : no neck mass was observed [No LAD] : no lymphadenopathy [Clear to Auscultation] : lungs were clear to auscultation bilaterally [No Murmurs] : no murmurs [Normal Rate] : heart rate was normal [Regular Rhythm] : with a regular rhythm [Carotids Normal] : carotid pulses were normal with no bruits [No Edema] : no peripheral edema [Not Tender] : non-tender [Soft] : abdomen soft [Normal Supraclavicular Nodes] : no supraclavicular lymphadenopathy [Normal Anterior Cervical Nodes] : no anterior cervical lymphadenopathy [No CVA Tenderness] : no ~M costovertebral angle tenderness [Kyphosis] : no kyphosis present [Normal Gait] : normal gait [No Involuntary Movements] : no involuntary movements were seen [Normal Strength/Tone] : muscle strength and tone were normal [No Rash] : no rash [Abdominal Striae] : no abdominal striae [Foot Ulcers] : no foot ulcers [Acne] : no acne [Hirsutism] : hirsutism present [No Tremors] : no tremors [Normal Sensation on Monofilament Testing] : normal sensation on monofilament testing of lower extremities [Normal Affect] : the affect was normal [Normal Mood] : the mood was normal [de-identified] : Markedly obese [de-identified] : Faint corneal arcus.  Visual fields normal to confrontational testing [de-identified] : Thyroid borderline increased in size, but without palpable nodularity [de-identified] : DP pulses non-palpable bilaterally, but capillary refill is normal [de-identified] : Difficult to feel a distinct liver edge given her obesity [de-identified] : Moderate swelling of both knees.   strength grossly normal [de-identified] : Minimal acanthosis in the posterior cervical area.  Moderate fronto-temporal scalp hair loss.  Moderate facial hirsutism along the chin [de-identified] : Vibratory sensation mildly decreased over the toes

## 2024-09-27 NOTE — REVIEW OF SYSTEMS
[Fatigue] : no fatigue [Decreased Appetite] : decreased appetite [Fever] : no fever [Chills] : no chills [Dry Eyes] : dryness [Blurred Vision] : no blurred vision [Eyes Itch] : no itch [Dysphagia] : no dysphagia [Hearing Loss] : no hearing loss  [Chest Pain] : no chest pain [Palpitations] : no palpitations [Lower Ext Edema] : no lower extremity edema [Shortness Of Breath] : no shortness of breath [Cough] : no cough [Wheezing] : no wheezing [Nausea] : no nausea [Constipation] : no constipation [Heartburn] : no heartburn [Diarrhea] : no diarrhea [Polyuria] : no polyuria [Dysuria] : no dysuria [Muscle Weakness] : no muscle weakness [Myalgia] : no myalgia  [Muscle Cramps] : no muscle cramps [Acne] : acne [Dry Skin] : dry skin [Hirsutism] : hirsutism [Hair Loss] : hair loss [Headaches] : no headaches [Difficulty Walking] : no difficulty walking [Tremors] : no tremors [Depression] : no depression [Insomnia] : no insomnia [Anxiety] : no anxiety [Stress] : stress [Polydipsia] : no polydipsia [Cold Intolerance] : cold intolerance [Heat Intolerance] : heat intolerance [Hot Flashes] : no hot flashes [Easy Bleeding] : no ~M tendency for easy bleeding [Easy Bruising] : no tendency for easy bruising [FreeTextEntry2] : Has gained back the 5 lb which she had lost prior to her last visit [FreeTextEntry3] : 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  Has only occasional nocturia [FreeTextEntry9] : Pain and swelling in both knees, L > R. [de-identified] : Usually removes facial hair by tweezing [de-identified] : Has paresthesias and mild prurutis (without a rash) over the anterior thigh of her R leg [de-identified] : Significant hot flashes

## 2024-09-27 NOTE — HISTORY OF PRESENT ILLNESS
[FreeTextEntry1] : 53-year-old woman who is followed for hyperprolactinemia, PCOS and obesity.  She was initially diagnosed with hyperprolactinemia and a pituitary microadenoma in 2002, with the adenoma believed to be prolactin-secreting.  She has been on cabergoline therapy since that time, though with occasional interruptions in therapy and recurrence of the hyperprolactinemia during those intervals.  Her PRL levels have been more consistently controlled during the past few years, and she has been off the cabergoline since early 2023..  The adenoma had decreased from 8 mm to 4-5 mm in size on an MRI done in 2018, and could not be identified on the most recent MRI in 2022.   She is on metformin therapy for the PCOS, but has had persistent hirsutism despite normalization of her testosterone level.  She has been on Wegovy for treatment of her obesity since mid-2023  Has not had any acute illnesses since her last visit Is now traveling for work nearly full-time.   Stopped the Wegovy two months ago, mostly because she was gaining weight on it.  Despite being off the drug, she still reports early satiety.   As expected, has difficulty eating "healthy" when she is on the road.  (Eats a lot more vegetables, mayo fresh vegetables, when she is at home) Because she is on the road, diet is very difficult to pin down: --Often skips breakfast.  Will sometimes just have eggs and fruit.  When she is at home, will sometimes just have a banana, sometimes a boiled egg, --Tries to eat salads for lunch  --Has been eating more beef --Seems to have cut down on her intake of sweets  --       Has now been on the Wegovy at 2.4 mg/week for the past 4-5 months, but has lost only 2 lb in weight. Reports notable appetite suppression for the first 2 days after each dose, but the effect then wears off and she begins to "crave" sugar and salt.  (The salt craving is usually in the form of chips). 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  Has been skipping lunch, and when she does eat, she has not been having salads as often as before.  Protein at supper is usually chicken--has been eating fish fairly infrequently.   Is still putting 2-3 tsp of sugar in her 3 cups of coffee every day She has not yet checked on insurance coverage for Mounjaro.  Is tolerating the spironolactone at 50 mg BID, using the generic from the local pharmacy.  She did not yet try increasing the 150 mg/day. Her facial hirsutism is possibly a bit better even though she has not increased the spironolactone. Denies any headaches or visual disturbances

## 2024-12-16 RX ORDER — TIRZEPATIDE 2.5 MG/.5ML
2.5 INJECTION, SOLUTION SUBCUTANEOUS
Qty: 4 | Refills: 0 | Status: ACTIVE | COMMUNITY
Start: 2024-12-16 | End: 1900-01-01

## 2025-01-10 ENCOUNTER — APPOINTMENT (OUTPATIENT)
Dept: ENDOCRINOLOGY | Facility: CLINIC | Age: 55
End: 2025-01-10

## 2025-08-11 ENCOUNTER — APPOINTMENT (OUTPATIENT)
Dept: ENDOCRINOLOGY | Facility: CLINIC | Age: 55
End: 2025-08-11
Payer: COMMERCIAL

## 2025-08-11 VITALS
SYSTOLIC BLOOD PRESSURE: 118 MMHG | OXYGEN SATURATION: 97 % | BODY MASS INDEX: 44.22 KG/M2 | WEIGHT: 259 LBS | TEMPERATURE: 97.1 F | HEART RATE: 81 BPM | DIASTOLIC BLOOD PRESSURE: 76 MMHG | HEIGHT: 64 IN

## 2025-08-11 DIAGNOSIS — E22.9 BENIGN NEOPLASM OF PITUITARY GLAND: ICD-10-CM

## 2025-08-11 DIAGNOSIS — G57.11 MERALGIA PARESTHETICA, RIGHT LOWER LIMB: ICD-10-CM

## 2025-08-11 DIAGNOSIS — L68.0 HIRSUTISM: ICD-10-CM

## 2025-08-11 DIAGNOSIS — E66.9 OBESITY, UNSPECIFIED: ICD-10-CM

## 2025-08-11 DIAGNOSIS — M25.611 STIFFNESS OF RIGHT SHOULDER, NOT ELSEWHERE CLASSIFIED: ICD-10-CM

## 2025-08-11 DIAGNOSIS — D35.2 BENIGN NEOPLASM OF PITUITARY GLAND: ICD-10-CM

## 2025-08-11 DIAGNOSIS — R73.01 IMPAIRED FASTING GLUCOSE: ICD-10-CM

## 2025-08-11 DIAGNOSIS — E78.5 HYPERLIPIDEMIA, UNSPECIFIED: ICD-10-CM

## 2025-08-11 DIAGNOSIS — E55.9 VITAMIN D DEFICIENCY, UNSPECIFIED: ICD-10-CM

## 2025-08-11 PROCEDURE — G2211 COMPLEX E/M VISIT ADD ON: CPT | Mod: NC

## 2025-08-11 PROCEDURE — 99215 OFFICE O/P EST HI 40 MIN: CPT

## 2025-08-11 RX ORDER — GABAPENTIN 100 MG/1
100 CAPSULE ORAL
Qty: 180 | Refills: 3 | Status: ACTIVE | COMMUNITY
Start: 2025-08-11 | End: 1900-01-01

## 2025-08-12 RX ORDER — ESTRADIOL 0.1 MG/D
0.1 PATCH, EXTENDED RELEASE TRANSDERMAL
Qty: 24 | Refills: 0 | Status: ACTIVE | COMMUNITY
Start: 2025-07-16

## 2025-08-12 RX ORDER — MELOXICAM 15 MG/1
15 TABLET ORAL
Qty: 30 | Refills: 0 | Status: ACTIVE | COMMUNITY
Start: 2025-07-22

## 2025-08-12 RX ORDER — PROGESTERONE 200 MG/1
200 CAPSULE ORAL
Qty: 36 | Refills: 0 | Status: ACTIVE | COMMUNITY
Start: 2025-07-16

## 2025-08-12 RX ORDER — TIRZEPATIDE 7.5 MG/.5ML
7.5 INJECTION, SOLUTION SUBCUTANEOUS
Qty: 2 | Refills: 0 | Status: ACTIVE | COMMUNITY
Start: 2025-08-05

## 2025-08-14 PROBLEM — R73.01 IMPAIRED FASTING GLUCOSE: Status: ACTIVE | Noted: 2025-08-14

## 2025-08-14 PROBLEM — G57.11 MERALGIA PARESTHETICA OF RIGHT SIDE: Status: ACTIVE | Noted: 2025-08-11

## 2025-08-14 PROBLEM — M25.611 STIFFNESS OF RIGHT SHOULDER, NOT ELSEWHERE CLASSIFIED: Status: ACTIVE | Noted: 2025-08-14

## (undated) DEVICE — PACK HAND

## (undated) DEVICE — TOURNIQUET CUFF 18" DUAL PORT SINGLE BLADDER W PLC  (BLACK)

## (undated) DEVICE — POSITIONER OA ARM ELEVATOR DISP

## (undated) DEVICE — MARKING PEN W RULER

## (undated) DEVICE — GLV 8 PROTEXIS (WHITE)

## (undated) DEVICE — DRSG STERISTRIPS 0.5 X 4"

## (undated) DEVICE — SLV COMPRESSION KNEE MED

## (undated) DEVICE — SUT MONOCRYL 5-0 18" P-3 UNDYED

## (undated) DEVICE — SYS ENDO STRATOS RELEASE 30 DEG 4MM

## (undated) DEVICE — SUT ETHILON 5-0 18" P-3

## (undated) DEVICE — TOURNIQUET CUFF 24" DUAL PORT SINGLE BLADDER W PLC (BLACK)

## (undated) DEVICE — GLV 8.5 PROTEXIS (WHITE)

## (undated) DEVICE — PREP BETADINE SPONGE STICKS

## (undated) DEVICE — SOL ANTI FOG

## (undated) DEVICE — WARMING BLANKET LOWER ADULT